# Patient Record
Sex: MALE | Race: WHITE | NOT HISPANIC OR LATINO
[De-identification: names, ages, dates, MRNs, and addresses within clinical notes are randomized per-mention and may not be internally consistent; named-entity substitution may affect disease eponyms.]

---

## 2018-03-12 ENCOUNTER — RX RENEWAL (OUTPATIENT)
Age: 73
End: 2018-03-12

## 2018-06-14 ENCOUNTER — RX RENEWAL (OUTPATIENT)
Age: 73
End: 2018-06-14

## 2018-08-29 ENCOUNTER — APPOINTMENT (OUTPATIENT)
Dept: INTERNAL MEDICINE | Facility: CLINIC | Age: 73
End: 2018-08-29
Payer: MEDICARE

## 2018-08-29 VITALS
BODY MASS INDEX: 23.42 KG/M2 | WEIGHT: 151 LBS | HEIGHT: 67.5 IN | TEMPERATURE: 98.1 F | SYSTOLIC BLOOD PRESSURE: 130 MMHG | DIASTOLIC BLOOD PRESSURE: 90 MMHG

## 2018-08-29 VITALS — DIASTOLIC BLOOD PRESSURE: 82 MMHG | SYSTOLIC BLOOD PRESSURE: 120 MMHG

## 2018-08-29 PROCEDURE — 93000 ELECTROCARDIOGRAM COMPLETE: CPT

## 2018-08-29 PROCEDURE — 99214 OFFICE O/P EST MOD 30 MIN: CPT | Mod: 25

## 2018-08-29 PROCEDURE — G0438: CPT

## 2018-08-29 PROCEDURE — 36415 COLL VENOUS BLD VENIPUNCTURE: CPT

## 2018-08-29 NOTE — PHYSICAL EXAM
[No Acute Distress] : no acute distress [Well Nourished] : well nourished [Well Developed] : well developed [Well-Appearing] : well-appearing [Normal Sclera/Conjunctiva] : normal sclera/conjunctiva [PERRL] : pupils equal round and reactive to light [EOMI] : extraocular movements intact [Normal Outer Ear/Nose] : the outer ears and nose were normal in appearance [Normal Oropharynx] : the oropharynx was normal [No JVD] : no jugular venous distention [Supple] : supple [No Lymphadenopathy] : no lymphadenopathy [Thyroid Normal, No Nodules] : the thyroid was normal and there were no nodules present [No Respiratory Distress] : no respiratory distress  [Clear to Auscultation] : lungs were clear to auscultation bilaterally [No Accessory Muscle Use] : no accessory muscle use [Normal Rate] : normal rate  [Regular Rhythm] : with a regular rhythm [Normal S1, S2] : normal S1 and S2 [No Murmur] : no murmur heard [No Carotid Bruits] : no carotid bruits [No Abdominal Bruit] : a ~M bruit was not heard ~T in the abdomen [No Varicosities] : no varicosities [Pedal Pulses Present] : the pedal pulses are present [No Edema] : there was no peripheral edema [No Extremity Clubbing/Cyanosis] : no extremity clubbing/cyanosis [No Palpable Aorta] : no palpable aorta [Soft] : abdomen soft [Non Tender] : non-tender [Non-distended] : non-distended [No Masses] : no abdominal mass palpated [No HSM] : no HSM [Normal Bowel Sounds] : normal bowel sounds [Normal Sphincter Tone] : normal sphincter tone [No Mass] : no mass [Prostate Tenderness] : the prostate was not tender [Prostate Enlarged] : was enlarged [Prostate Size___ (Scale 0-4)] : prostate size was [unfilled] on a scale of 0-4 [Normal Posterior Cervical Nodes] : no posterior cervical lymphadenopathy [Normal Anterior Cervical Nodes] : no anterior cervical lymphadenopathy [No CVA Tenderness] : no CVA  tenderness [No Spinal Tenderness] : no spinal tenderness [No Joint Swelling] : no joint swelling [Grossly Normal Strength/Tone] : grossly normal strength/tone [No Rash] : no rash [Normal Gait] : normal gait [Coordination Grossly Intact] : coordination grossly intact [No Focal Deficits] : no focal deficits [Deep Tendon Reflexes (DTR)] : deep tendon reflexes were 2+ and symmetric [Normal Affect] : the affect was normal [Normal Insight/Judgement] : insight and judgment were intact [Stool Occult Blood] : stool negative for occult blood [Prostate Nodule] : did not have a nodule

## 2018-08-29 NOTE — HEALTH RISK ASSESSMENT
[Very Good] : ~his/her~  mood as very good [Intercurrent ED visits] : went to ED [No falls in past year] : Patient reported no falls in the past year [0] : 2) Feeling down, depressed, or hopeless: Not at all (0) [Employed] : employed [] :  [Fully functional (bathing, dressing, toileting, transferring, walking, feeding)] : Fully functional (bathing, dressing, toileting, transferring, walking, feeding) [Fully functional (using the telephone, shopping, preparing meals, housekeeping, doing laundry, using] : Fully functional and needs no help or supervision to perform IADLs (using the telephone, shopping, preparing meals, housekeeping, doing laundry, using transportation, managing medications and managing finances) [Discussed at today's visit] : Advance Directives Discussed at today's visit [] : No [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 beer [[id number]] : 0 [Language] : denies difficulty with language [Behavior] : denies difficulty with behavior [Learning/Retaining New Information] : denies difficulty learning/retaining new information [Handling Complex Tasks] : denies difficulty handling complex tasks [Reasoning] : denies difficulty with reasoning [Spatial Ability and Orientation] : denies difficulty with spatial ability and orientation [Reports changes in hearing] : Reports no changes in hearing [Reports changes in vision] : Reports no changes in vision [Reports changes in dental health] : Reports no changes in dental health [ColonoscopyDate] : Car. 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

## 2018-08-29 NOTE — HISTORY OF PRESENT ILLNESS
[FreeTextEntry1] : here for CPE [de-identified] : \par 5 yrs. s/p esophagogastrectomy for esophageal ca;\par CT c/a/p in June ( at [facility])---no active disease\par \par \par generally well\par \par exercise---weights, not much cardio. (has treadmill in basement; doesn't use)\par \par diet---good re: fats /cholesterol\par \par ROS---URI/minimally productive cough; taking Mucinex DM; no fever, chest pain, SOB

## 2018-08-29 NOTE — ASSESSMENT
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 states had neg. bx. in past\par         hyperlipidemia---ran out of statin\par \par plan---FBW:   CBC, lipids, LDL, CMP, FT4, TSH\par            resume atorvastatin 20 mg daily\par            requesting rx for Viagra 50mg prn\par            stress echo (CAD)\par            ? urology consult----PSA pending\par            annual CPE\par         \par

## 2018-08-29 NOTE — REVIEW OF SYSTEMS
[Patient Intake Form Reviewed] : Patient intake form was reviewed [Cough] : cough [Negative] : Respiratory

## 2018-08-30 ENCOUNTER — NON-APPOINTMENT (OUTPATIENT)
Age: 73
End: 2018-08-30

## 2018-08-30 LAB
ALBUMIN SERPL ELPH-MCNC: 4 G/DL
ALP BLD-CCNC: 101 U/L
ALT SERPL-CCNC: 10 U/L
ANION GAP SERPL CALC-SCNC: 13 MMOL/L
APPEARANCE: CLEAR
AST SERPL-CCNC: 20 U/L
BACTERIA: NEGATIVE
BASOPHILS # BLD AUTO: 0.05 K/UL
BASOPHILS NFR BLD AUTO: 0.7 %
BILIRUB SERPL-MCNC: 0.4 MG/DL
BILIRUBIN URINE: NEGATIVE
BLOOD URINE: NEGATIVE
BUN SERPL-MCNC: 17 MG/DL
CALCIUM SERPL-MCNC: 9.2 MG/DL
CHLORIDE SERPL-SCNC: 105 MMOL/L
CHOLEST SERPL-MCNC: 182 MG/DL
CHOLEST/HDLC SERPL: 3.4 RATIO
CO2 SERPL-SCNC: 25 MMOL/L
COLOR: YELLOW
CREAT SERPL-MCNC: 1 MG/DL
EOSINOPHIL # BLD AUTO: 0.24 K/UL
EOSINOPHIL NFR BLD AUTO: 3.4 %
GLUCOSE QUALITATIVE U: NEGATIVE MG/DL
GLUCOSE SERPL-MCNC: 85 MG/DL
HCT VFR BLD CALC: 43.7 %
HDLC SERPL-MCNC: 53 MG/DL
HGB BLD-MCNC: 13.9 G/DL
HYALINE CASTS: 0 /LPF
IMM GRANULOCYTES NFR BLD AUTO: 0.6 %
KETONES URINE: NEGATIVE
LDLC SERPL CALC-MCNC: 115 MG/DL
LDLC SERPL DIRECT ASSAY-MCNC: 122 MG/DL
LEUKOCYTE ESTERASE URINE: ABNORMAL
LYMPHOCYTES # BLD AUTO: 1.15 K/UL
LYMPHOCYTES NFR BLD AUTO: 16.1 %
MAN DIFF?: NORMAL
MCHC RBC-ENTMCNC: 28.7 PG
MCHC RBC-ENTMCNC: 31.8 GM/DL
MCV RBC AUTO: 90.1 FL
MICROSCOPIC-UA: NORMAL
MONOCYTES # BLD AUTO: 0.64 K/UL
MONOCYTES NFR BLD AUTO: 8.9 %
NEUTROPHILS # BLD AUTO: 5.04 K/UL
NEUTROPHILS NFR BLD AUTO: 70.3 %
NITRITE URINE: POSITIVE
PH URINE: 5.5
PLATELET # BLD AUTO: 255 K/UL
POTASSIUM SERPL-SCNC: 4.3 MMOL/L
PROT SERPL-MCNC: 7.3 G/DL
PROTEIN URINE: NEGATIVE MG/DL
PSA SERPL-MCNC: 12.43 NG/ML
RBC # BLD: 4.85 M/UL
RBC # FLD: 14.8 %
RED BLOOD CELLS URINE: 12 /HPF
SAVE SPECIMEN: NORMAL
SODIUM SERPL-SCNC: 143 MMOL/L
SPECIFIC GRAVITY URINE: 1.02
SQUAMOUS EPITHELIAL CELLS: 0 /HPF
T4 FREE SERPL-MCNC: 1.2 NG/DL
TRIGL SERPL-MCNC: 71 MG/DL
TSH SERPL-ACNC: 1.52 UIU/ML
UROBILINOGEN URINE: NEGATIVE MG/DL
WBC # FLD AUTO: 7.16 K/UL
WHITE BLOOD CELLS URINE: 52 /HPF

## 2019-08-11 ENCOUNTER — RX RENEWAL (OUTPATIENT)
Age: 74
End: 2019-08-11

## 2020-07-27 ENCOUNTER — RX RENEWAL (OUTPATIENT)
Age: 75
End: 2020-07-27

## 2020-10-19 ENCOUNTER — RX RENEWAL (OUTPATIENT)
Age: 75
End: 2020-10-19

## 2020-12-30 ENCOUNTER — APPOINTMENT (OUTPATIENT)
Dept: INTERNAL MEDICINE | Facility: CLINIC | Age: 75
End: 2020-12-30
Payer: MEDICARE

## 2020-12-30 VITALS — SYSTOLIC BLOOD PRESSURE: 140 MMHG | DIASTOLIC BLOOD PRESSURE: 70 MMHG

## 2020-12-30 VITALS
RESPIRATION RATE: 17 BRPM | WEIGHT: 152 LBS | OXYGEN SATURATION: 98 % | SYSTOLIC BLOOD PRESSURE: 136 MMHG | HEART RATE: 82 BPM | BODY MASS INDEX: 23.86 KG/M2 | HEIGHT: 67 IN | DIASTOLIC BLOOD PRESSURE: 90 MMHG

## 2020-12-30 VITALS — TEMPERATURE: 97.3 F

## 2020-12-30 DIAGNOSIS — E55.9 VITAMIN D DEFICIENCY, UNSPECIFIED: ICD-10-CM

## 2020-12-30 DIAGNOSIS — R94.31 ABNORMAL ELECTROCARDIOGRAM [ECG] [EKG]: ICD-10-CM

## 2020-12-30 DIAGNOSIS — R30.0 DYSURIA: ICD-10-CM

## 2020-12-30 DIAGNOSIS — J30.9 ALLERGIC RHINITIS, UNSPECIFIED: ICD-10-CM

## 2020-12-30 DIAGNOSIS — Z12.5 ENCOUNTER FOR SCREENING FOR MALIGNANT NEOPLASM OF PROSTATE: ICD-10-CM

## 2020-12-30 PROCEDURE — 99214 OFFICE O/P EST MOD 30 MIN: CPT | Mod: 25

## 2020-12-30 PROCEDURE — G0439: CPT

## 2020-12-30 PROCEDURE — 36415 COLL VENOUS BLD VENIPUNCTURE: CPT

## 2020-12-30 PROCEDURE — 93000 ELECTROCARDIOGRAM COMPLETE: CPT

## 2020-12-30 NOTE — HEALTH RISK ASSESSMENT
[Excellent] : ~his/her~  mood as  excellent [Intercurrent ED visits] : went to ED [Yes] : Yes [No falls in past year] : Patient reported no falls in the past year [0] : 2) Feeling down, depressed, or hopeless: Not at all (0) [Name: ___] : Health Care Proxy's Name: [unfilled]  [Relationship: ___] : Relationship: [unfilled] [I will adhere to the patient's wishes as expressed in the advance directive except as noted below.] : I will adhere to the patient's wishes as expressed in the advance directive except as noted below [None] : None [With Family] : lives with family [# of Members in Household ___] :  household currently consist of [unfilled] member(s) [Employed] : employed [College] : College [] :  [# Of Children ___] : has [unfilled] children [Sexually Active] : sexually active [Feels Safe at Home] : Feels safe at home [Fully functional (bathing, dressing, toileting, transferring, walking, feeding)] : Fully functional (bathing, dressing, toileting, transferring, walking, feeding) [Fully functional (using the telephone, shopping, preparing meals, housekeeping, doing laundry, using] : Fully functional and needs no help or supervision to perform IADLs (using the telephone, shopping, preparing meals, housekeeping, doing laundry, using transportation, managing medications and managing finances) [Reports normal functional visual acuity (ie: able to read med bottle)] : Reports normal functional visual acuity [Smoke Detector] : smoke detector [Carbon Monoxide Detector] : carbon monoxide detector [Seat Belt] :  uses seat belt [Sunscreen] : uses sunscreen [] : No [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 week [de-identified] : 10-15 min. daily [de-identified] : ad marisol [[id number]] : 0 [Change in mental status noted] : No change in mental status noted [High Risk Behavior] : no high risk behavior [Reports changes in hearing] : Reports no changes in hearing [Reports changes in vision] : Reports no changes in vision [Reports changes in dental health] : Reports no changes in dental health [Guns at Home] : no guns at home [Travel to Developing Areas] : does not  travel to developing areas [TB Exposure] : is not being exposed to tuberculosis [ColonoscopyDate] : 01/13 [ColonoscopyComments] : f/u 3 yr.s; mother d. 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

## 2020-12-30 NOTE — ASSESSMENT
[FreeTextEntry1] : \par ecg----SR; NSSTT abn.; NSCSPT\par cxr---not done ; gets surveillance CTs chest /abdomen/pelvis for esophageal ca\par \par \par imp---hyperlipidemia---panel to be checked on statin\par          allergic rhinitis===prn use of fluticasone nasal spray\par          adenocarcinoma of esophagus---s/p chemo, chemo-radiation and surgery in 2013; currently\par          on surveillance per MDs at [facility]\par          dysuria--w/o chills or fever; intermittent\par          elevated BP w/o dx. of hypertension\par \par plan---FBW per CPE\par            home BPs advised; to record and send me report; ? meds\par            continue [facility] surveillance for esophageal ca\par            urology f/u\par            annual CPE\par

## 2020-12-30 NOTE — HISTORY OF PRESENT ILLNESS
[FreeTextEntry1] :  \par here for CPE [de-identified] : \par SH----working; no intention of retiring\par \par PH---esophageal ca---w/o active disease per MD JENNIFER,  June 2020\par \par ROS---intermittent burning passing urine (every couple of weeks)

## 2020-12-30 NOTE — PHYSICAL EXAM
[No Acute Distress] : no acute distress [Well Nourished] : well nourished [Well Developed] : well developed [Well-Appearing] : well-appearing [Normal Sclera/Conjunctiva] : normal sclera/conjunctiva [PERRL] : pupils equal round and reactive to light [EOMI] : extraocular movements intact [Normal Outer Ear/Nose] : the outer ears and nose were normal in appearance [Normal Oropharynx] : the oropharynx was normal [No JVD] : no jugular venous distention [No Lymphadenopathy] : no lymphadenopathy [Supple] : supple [Thyroid Normal, No Nodules] : the thyroid was normal and there were no nodules present [No Respiratory Distress] : no respiratory distress  [No Accessory Muscle Use] : no accessory muscle use [Clear to Auscultation] : lungs were clear to auscultation bilaterally [Normal Rate] : normal rate  [Regular Rhythm] : with a regular rhythm [Normal S1, S2] : normal S1 and S2 [No Murmur] : no murmur heard [No Carotid Bruits] : no carotid bruits [No Abdominal Bruit] : a ~M bruit was not heard ~T in the abdomen [No Varicosities] : no varicosities [Pedal Pulses Present] : the pedal pulses are present [No Edema] : there was no peripheral edema [No Palpable Aorta] : no palpable aorta [No Extremity Clubbing/Cyanosis] : no extremity clubbing/cyanosis [Soft] : abdomen soft [Non Tender] : non-tender [Non-distended] : non-distended [No Masses] : no abdominal mass palpated [No HSM] : no HSM [Normal Bowel Sounds] : normal bowel sounds [Normal Posterior Cervical Nodes] : no posterior cervical lymphadenopathy [Normal Anterior Cervical Nodes] : no anterior cervical lymphadenopathy [No CVA Tenderness] : no CVA  tenderness [No Spinal Tenderness] : no spinal tenderness [No Joint Swelling] : no joint swelling [Grossly Normal Strength/Tone] : grossly normal strength/tone [No Rash] : no rash [Coordination Grossly Intact] : coordination grossly intact [No Focal Deficits] : no focal deficits [Normal Gait] : normal gait [Normal Affect] : the affect was normal [Normal Insight/Judgement] : insight and judgment were intact [de-identified] : will be seeing urologist

## 2020-12-31 ENCOUNTER — NON-APPOINTMENT (OUTPATIENT)
Age: 75
End: 2020-12-31

## 2021-01-01 LAB
24R-OH-CALCIDIOL SERPL-MCNC: 49.6 PG/ML
ALBUMIN SERPL ELPH-MCNC: 4.2 G/DL
ALP BLD-CCNC: 101 U/L
ALT SERPL-CCNC: 14 U/L
ANION GAP SERPL CALC-SCNC: 13 MMOL/L
APPEARANCE: ABNORMAL
AST SERPL-CCNC: 20 U/L
BACTERIA: ABNORMAL
BASOPHILS # BLD AUTO: 0.07 K/UL
BASOPHILS NFR BLD AUTO: 1.1 %
BILIRUB SERPL-MCNC: 0.5 MG/DL
BILIRUBIN URINE: NEGATIVE
BLOOD URINE: NEGATIVE
BUN SERPL-MCNC: 19 MG/DL
CALCIUM SERPL-MCNC: 9.6 MG/DL
CHLORIDE SERPL-SCNC: 103 MMOL/L
CHOLEST SERPL-MCNC: 164 MG/DL
CO2 SERPL-SCNC: 23 MMOL/L
COLOR: YELLOW
CREAT SERPL-MCNC: 1.03 MG/DL
EOSINOPHIL # BLD AUTO: 0.21 K/UL
EOSINOPHIL NFR BLD AUTO: 3.3 %
GLUCOSE QUALITATIVE U: NEGATIVE
GLUCOSE SERPL-MCNC: 94 MG/DL
HCT VFR BLD CALC: 45.3 %
HDLC SERPL-MCNC: 69 MG/DL
HGB BLD-MCNC: 14.5 G/DL
HYALINE CASTS: 1 /LPF
IMM GRANULOCYTES NFR BLD AUTO: 0.3 %
KETONES URINE: NEGATIVE
LDLC SERPL CALC-MCNC: 85 MG/DL
LDLC SERPL DIRECT ASSAY-MCNC: 88 MG/DL
LEUKOCYTE ESTERASE URINE: ABNORMAL
LYMPHOCYTES # BLD AUTO: 1.5 K/UL
LYMPHOCYTES NFR BLD AUTO: 23.4 %
MAN DIFF?: NORMAL
MCHC RBC-ENTMCNC: 29.7 PG
MCHC RBC-ENTMCNC: 32 GM/DL
MCV RBC AUTO: 92.6 FL
MICROSCOPIC-UA: NORMAL
MONOCYTES # BLD AUTO: 0.52 K/UL
MONOCYTES NFR BLD AUTO: 8.1 %
NEUTROPHILS # BLD AUTO: 4.08 K/UL
NEUTROPHILS NFR BLD AUTO: 63.8 %
NITRITE URINE: POSITIVE
NONHDLC SERPL-MCNC: 95 MG/DL
PH URINE: 7.5
PLATELET # BLD AUTO: 213 K/UL
POTASSIUM SERPL-SCNC: 4 MMOL/L
PROT SERPL-MCNC: 7.6 G/DL
PROTEIN URINE: ABNORMAL
PSA FREE FLD-MCNC: 20 %
PSA FREE SERPL-MCNC: 2.33 NG/ML
PSA SERPL-MCNC: 11.7 NG/ML
RBC # BLD: 4.89 M/UL
RBC # FLD: 14.6 %
RED BLOOD CELLS URINE: 7 /HPF
SODIUM SERPL-SCNC: 140 MMOL/L
SPECIFIC GRAVITY URINE: 1.03
SQUAMOUS EPITHELIAL CELLS: 1 /HPF
T4 FREE SERPL-MCNC: 1 NG/DL
TRIGL SERPL-MCNC: 52 MG/DL
TRIPLE PHOSPHATE CRYSTALS: ABNORMAL
TSH SERPL-ACNC: 2.48 UIU/ML
URINE COMMENTS: NORMAL
UROBILINOGEN URINE: NORMAL
WBC # FLD AUTO: 6.4 K/UL
WHITE BLOOD CELLS URINE: 281 /HPF

## 2021-01-12 ENCOUNTER — RX RENEWAL (OUTPATIENT)
Age: 76
End: 2021-01-12

## 2021-02-02 ENCOUNTER — TRANSCRIPTION ENCOUNTER (OUTPATIENT)
Age: 76
End: 2021-02-02

## 2022-01-25 ENCOUNTER — RX RENEWAL (OUTPATIENT)
Age: 77
End: 2022-01-25

## 2022-08-17 DIAGNOSIS — Z00.00 ENCOUNTER FOR GENERAL ADULT MEDICAL EXAMINATION W/OUT ABNORMAL FINDINGS: ICD-10-CM

## 2022-08-18 ENCOUNTER — APPOINTMENT (OUTPATIENT)
Dept: INTERNAL MEDICINE | Facility: CLINIC | Age: 77
End: 2022-08-18

## 2022-08-18 VITALS
TEMPERATURE: 98 F | DIASTOLIC BLOOD PRESSURE: 82 MMHG | SYSTOLIC BLOOD PRESSURE: 136 MMHG | WEIGHT: 152 LBS | HEART RATE: 61 BPM | BODY MASS INDEX: 23.86 KG/M2 | OXYGEN SATURATION: 96 % | HEIGHT: 67 IN

## 2022-08-18 VITALS — SYSTOLIC BLOOD PRESSURE: 130 MMHG | DIASTOLIC BLOOD PRESSURE: 90 MMHG

## 2022-08-18 VITALS — DIASTOLIC BLOOD PRESSURE: 80 MMHG | SYSTOLIC BLOOD PRESSURE: 130 MMHG

## 2022-08-18 DIAGNOSIS — N21.0 CALCULUS IN BLADDER: ICD-10-CM

## 2022-08-18 DIAGNOSIS — R97.20 ELEVATED PROSTATE, SPECIFIC ANTIGEN [PSA]: ICD-10-CM

## 2022-08-18 DIAGNOSIS — C15.9 MALIGNANT NEOPLASM OF ESOPHAGUS, UNSPECIFIED: ICD-10-CM

## 2022-08-18 DIAGNOSIS — R03.0 ELEVATED BLOOD-PRESSURE READING, W/OUT DIAGNOSIS OF HYPERTENSION: ICD-10-CM

## 2022-08-18 DIAGNOSIS — I10 ESSENTIAL (PRIMARY) HYPERTENSION: ICD-10-CM

## 2022-08-18 LAB
ALBUMIN SERPL ELPH-MCNC: 4.3 G/DL
ALP BLD-CCNC: 84 U/L
ALT SERPL-CCNC: 11 U/L
ANION GAP SERPL CALC-SCNC: 13 MMOL/L
APPEARANCE: CLEAR
AST SERPL-CCNC: 18 U/L
BACTERIA: NEGATIVE
BASOPHILS # BLD AUTO: 0.08 K/UL
BASOPHILS NFR BLD AUTO: 1.4 %
BILIRUB SERPL-MCNC: 0.5 MG/DL
BILIRUBIN URINE: NEGATIVE
BLOOD URINE: ABNORMAL
BUN SERPL-MCNC: 18 MG/DL
CALCIUM SERPL-MCNC: 9.6 MG/DL
CHLORIDE SERPL-SCNC: 105 MMOL/L
CHOLEST SERPL-MCNC: 223 MG/DL
CO2 SERPL-SCNC: 24 MMOL/L
COLOR: YELLOW
CREAT SERPL-MCNC: 1.09 MG/DL
EGFR: 69 ML/MIN/1.73M2
EOSINOPHIL # BLD AUTO: 0.19 K/UL
EOSINOPHIL NFR BLD AUTO: 3.3 %
ERYTHROCYTE [SEDIMENTATION RATE] IN BLOOD BY WESTERGREN METHOD: 25 MM/HR
ESTIMATED AVERAGE GLUCOSE: 131 MG/DL
GLUCOSE QUALITATIVE U: NEGATIVE
GLUCOSE SERPL-MCNC: 100 MG/DL
HBA1C MFR BLD HPLC: 6.2 %
HCT VFR BLD CALC: 44.6 %
HDLC SERPL-MCNC: 69 MG/DL
HGB BLD-MCNC: 14.2 G/DL
HYALINE CASTS: 1 /LPF
IMM GRANULOCYTES NFR BLD AUTO: 0.2 %
KETONES URINE: NEGATIVE
LDLC SERPL CALC-MCNC: 141 MG/DL
LDLC SERPL DIRECT ASSAY-MCNC: 145 MG/DL
LEUKOCYTE ESTERASE URINE: NEGATIVE
LYMPHOCYTES # BLD AUTO: 1.11 K/UL
LYMPHOCYTES NFR BLD AUTO: 19.5 %
MAN DIFF?: NORMAL
MCHC RBC-ENTMCNC: 28.9 PG
MCHC RBC-ENTMCNC: 31.8 GM/DL
MCV RBC AUTO: 90.7 FL
MICROSCOPIC-UA: NORMAL
MONOCYTES # BLD AUTO: 0.45 K/UL
MONOCYTES NFR BLD AUTO: 7.9 %
NEUTROPHILS # BLD AUTO: 3.85 K/UL
NEUTROPHILS NFR BLD AUTO: 67.7 %
NITRITE URINE: NEGATIVE
NONHDLC SERPL-MCNC: 154 MG/DL
PH URINE: 7
PLATELET # BLD AUTO: 212 K/UL
POTASSIUM SERPL-SCNC: 4.5 MMOL/L
PROT SERPL-MCNC: 6.7 G/DL
PROTEIN URINE: NORMAL
PSA SERPL-MCNC: 13.2 NG/ML
RBC # BLD: 4.92 M/UL
RBC # FLD: 14.5 %
RED BLOOD CELLS URINE: 201 /HPF
SODIUM SERPL-SCNC: 142 MMOL/L
SPECIFIC GRAVITY URINE: 1.02
SQUAMOUS EPITHELIAL CELLS: 4 /HPF
T4 FREE SERPL-MCNC: 1 NG/DL
TRIGL SERPL-MCNC: 61 MG/DL
TSH SERPL-ACNC: 2.32 UIU/ML
UROBILINOGEN URINE: NORMAL
WBC # FLD AUTO: 5.69 K/UL
WHITE BLOOD CELLS URINE: 1 /HPF

## 2022-08-18 PROCEDURE — G0439: CPT

## 2022-08-18 PROCEDURE — 93000 ELECTROCARDIOGRAM COMPLETE: CPT

## 2022-08-18 PROCEDURE — 99214 OFFICE O/P EST MOD 30 MIN: CPT | Mod: 25

## 2022-08-18 PROCEDURE — G0438: CPT

## 2022-08-19 ENCOUNTER — NON-APPOINTMENT (OUTPATIENT)
Age: 77
End: 2022-08-19

## 2022-12-16 ENCOUNTER — NON-APPOINTMENT (OUTPATIENT)
Age: 77
End: 2022-12-16

## 2024-04-18 ENCOUNTER — LABORATORY RESULT (OUTPATIENT)
Age: 79
End: 2024-04-18

## 2024-04-18 ENCOUNTER — APPOINTMENT (OUTPATIENT)
Dept: CARDIOLOGY | Facility: CLINIC | Age: 79
End: 2024-04-18
Payer: MEDICARE

## 2024-04-18 ENCOUNTER — NON-APPOINTMENT (OUTPATIENT)
Age: 79
End: 2024-04-18

## 2024-04-18 VITALS
HEIGHT: 67 IN | SYSTOLIC BLOOD PRESSURE: 112 MMHG | DIASTOLIC BLOOD PRESSURE: 82 MMHG | BODY MASS INDEX: 23.7 KG/M2 | OXYGEN SATURATION: 97 % | HEART RATE: 72 BPM | WEIGHT: 151 LBS

## 2024-04-18 DIAGNOSIS — R06.09 OTHER FORMS OF DYSPNEA: ICD-10-CM

## 2024-04-18 DIAGNOSIS — I25.10 ATHEROSCLEROTIC HEART DISEASE OF NATIVE CORONARY ARTERY W/OUT ANGINA PECTORIS: ICD-10-CM

## 2024-04-18 DIAGNOSIS — R73.03 PREDIABETES.: ICD-10-CM

## 2024-04-18 DIAGNOSIS — E78.5 HYPERLIPIDEMIA, UNSPECIFIED: ICD-10-CM

## 2024-04-18 DIAGNOSIS — I49.1 ATRIAL PREMATURE DEPOLARIZATION: ICD-10-CM

## 2024-04-18 PROCEDURE — 99204 OFFICE O/P NEW MOD 45 MIN: CPT

## 2024-04-18 PROCEDURE — 93040 RHYTHM ECG WITH REPORT: CPT | Mod: 59

## 2024-04-18 PROCEDURE — 36415 COLL VENOUS BLD VENIPUNCTURE: CPT

## 2024-04-18 PROCEDURE — G2211 COMPLEX E/M VISIT ADD ON: CPT

## 2024-04-18 PROCEDURE — 93000 ELECTROCARDIOGRAM COMPLETE: CPT

## 2024-04-18 RX ORDER — ATORVASTATIN CALCIUM 20 MG/1
20 TABLET, FILM COATED ORAL DAILY
Qty: 90 | Refills: 3 | Status: DISCONTINUED | COMMUNITY
Start: 2022-08-18 | End: 2024-04-18

## 2024-04-18 RX ORDER — AZELASTINE HYDROCHLORIDE 137 UG/1
0.1 SPRAY, METERED NASAL TWICE DAILY
Qty: 1 | Refills: 4 | Status: DISCONTINUED | COMMUNITY
Start: 2020-12-30 | End: 2024-04-18

## 2024-04-18 RX ORDER — ATORVASTATIN CALCIUM 20 MG/1
20 TABLET, FILM COATED ORAL DAILY
Qty: 90 | Refills: 3 | Status: ACTIVE | COMMUNITY
Start: 2024-04-18 | End: 1900-01-01

## 2024-04-18 RX ORDER — ASPIRIN ENTERIC COATED TABLETS 81 MG 81 MG/1
81 TABLET, DELAYED RELEASE ORAL DAILY
Qty: 90 | Refills: 3 | Status: ACTIVE | COMMUNITY
Start: 2024-04-18

## 2024-04-20 ENCOUNTER — NON-APPOINTMENT (OUTPATIENT)
Age: 79
End: 2024-04-20

## 2024-04-28 NOTE — HISTORY OF PRESENT ILLNESS
[FreeTextEntry1] : 04/18/2024 - Office visit: PCP: none Past medical history notable for CAD (nonocclusive disease CCTA in 2010), hyperlipidemia, reactive hypertension, pre-diabetes, esophageal CA, bladder stones, elevated PSA (he sees a urologist, last 2 years ago). He has had shortness of breath for 1-2 months climbing 3-4 flights of steps stairs.  He does not experience shortness of breath when rowing or doing resistance exercises. He denies chest pain. He currently is on no medications; on his own, he stopped taking atorvastatin twice. He never smoked cigarettes.   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 Prior cardiac workup includes: TTE (5/8/2013): LVEF 60%.  Mild AR/MR/TR. Family history history is notable for his brother with an MI at 44 years old and sisters with MIs at 55 and 60 years old.

## 2024-04-28 NOTE — DISCUSSION/SUMMARY
[FreeTextEntry1] : PLAN (ADDITIONAL): Check blood pressures at home, call me 7-10 days. Discuss with patient that he needs a PCP  VITAL SIGNS: BP: 125/88, 118/92, 122/94, 126/88  PHYSICAL EXAMINATION: Constitutional: well developed/well nourished, no acute distress Eyes: no xanthelasma ENT: no oral cyanosis Neck: without jugular venous distention, without carotid bruits Cardiovascular: regular rhythm, normal S1 and S2, grade 1/6 systolic murmur Respiratory: no respiratory distress, lungs clear to auscultation bilaterally Abdomen: soft, non-tender, no abdominal mass palpated, no hepatosplenomegaly, bowel sounds normal Extremities: without clubbing, cyanosis, edema, or tenderness Musculoskeletal: gait sufficient for exercise testing Neurologic: alert and oriented X 3 Psych: affect normal

## 2024-05-12 LAB
ALBUMIN SERPL ELPH-MCNC: 4.4 G/DL
ALP BLD-CCNC: 118 U/L
ALT SERPL-CCNC: 11 U/L
ANION GAP SERPL CALC-SCNC: 12 MMOL/L
AST SERPL-CCNC: 19 U/L
BILIRUB SERPL-MCNC: 0.7 MG/DL
BUN SERPL-MCNC: 17 MG/DL
CALCIUM SERPL-MCNC: 9.5 MG/DL
CHLORIDE SERPL-SCNC: 102 MMOL/L
CHOLEST SERPL-MCNC: 249 MG/DL
CO2 SERPL-SCNC: 25 MMOL/L
CREAT SERPL-MCNC: 1.1 MG/DL
EGFR: 68 ML/MIN/1.73M2
ESTIMATED AVERAGE GLUCOSE: 131 MG/DL
GLUCOSE SERPL-MCNC: 95 MG/DL
HBA1C MFR BLD HPLC: 6.2 %
HDLC SERPL-MCNC: 70 MG/DL
LDLC SERPL CALC-MCNC: 167 MG/DL
LDLC SERPL DIRECT ASSAY-MCNC: 168 MG/DL
NONHDLC SERPL-MCNC: 178 MG/DL
POTASSIUM SERPL-SCNC: 4.3 MMOL/L
PROT SERPL-MCNC: 8 G/DL
SODIUM SERPL-SCNC: 138 MMOL/L
T3 SERPL-MCNC: 85 NG/DL
T3RU NFR SERPL: 1 TBI
T4 FREE SERPL-MCNC: 1.1 NG/DL
T4 SERPL-MCNC: 6.7 UG/DL
TRIGL SERPL-MCNC: 68 MG/DL
TSH SERPL-ACNC: 2.81 UIU/ML

## 2024-05-15 ENCOUNTER — APPOINTMENT (OUTPATIENT)
Dept: CARDIOLOGY | Facility: CLINIC | Age: 79
End: 2024-05-15
Payer: MEDICARE

## 2024-05-15 PROCEDURE — A9500: CPT

## 2024-05-15 PROCEDURE — 78452 HT MUSCLE IMAGE SPECT MULT: CPT

## 2024-05-15 PROCEDURE — 93015 CV STRESS TEST SUPVJ I&R: CPT

## 2024-05-15 PROCEDURE — 93306 TTE W/DOPPLER COMPLETE: CPT

## 2024-06-19 ENCOUNTER — NON-APPOINTMENT (OUTPATIENT)
Age: 79
End: 2024-06-19

## 2024-07-31 ENCOUNTER — OFFICE (OUTPATIENT)
Dept: URBAN - METROPOLITAN AREA CLINIC 12 | Facility: CLINIC | Age: 79
Setting detail: OPHTHALMOLOGY
End: 2024-07-31
Payer: MEDICARE

## 2024-07-31 DIAGNOSIS — H02.403: ICD-10-CM

## 2024-07-31 DIAGNOSIS — H25.13: ICD-10-CM

## 2024-07-31 DIAGNOSIS — H43.823: ICD-10-CM

## 2024-07-31 DIAGNOSIS — H43.393: ICD-10-CM

## 2024-07-31 PROBLEM — H52.7 REFRACTIVE ERROR: Status: ACTIVE | Noted: 2024-07-31

## 2024-07-31 PROCEDURE — 92134 CPTRZ OPH DX IMG PST SGM RTA: CPT | Performed by: OPHTHALMOLOGY

## 2024-07-31 PROCEDURE — 92004 COMPRE OPH EXAM NEW PT 1/>: CPT | Performed by: OPHTHALMOLOGY

## 2024-07-31 ASSESSMENT — CONFRONTATIONAL VISUAL FIELD TEST (CVF)
OD_FINDINGS: FULL
OS_FINDINGS: FULL

## 2024-07-31 ASSESSMENT — LID POSITION - PTOSIS
OS_PTOSIS: LUL 2+
OD_PTOSIS: RUL 2+

## 2024-08-12 ENCOUNTER — NON-APPOINTMENT (OUTPATIENT)
Age: 79
End: 2024-08-12

## 2024-08-12 ENCOUNTER — APPOINTMENT (OUTPATIENT)
Dept: CARDIOLOGY | Facility: CLINIC | Age: 79
End: 2024-08-12
Payer: MEDICARE

## 2024-08-12 VITALS
SYSTOLIC BLOOD PRESSURE: 152 MMHG | HEART RATE: 58 BPM | OXYGEN SATURATION: 97 % | BODY MASS INDEX: 24.12 KG/M2 | DIASTOLIC BLOOD PRESSURE: 80 MMHG | WEIGHT: 154 LBS

## 2024-08-12 VITALS — DIASTOLIC BLOOD PRESSURE: 74 MMHG | SYSTOLIC BLOOD PRESSURE: 124 MMHG

## 2024-08-12 DIAGNOSIS — I10 ESSENTIAL (PRIMARY) HYPERTENSION: ICD-10-CM

## 2024-08-12 DIAGNOSIS — I25.10 ATHEROSCLEROTIC HEART DISEASE OF NATIVE CORONARY ARTERY W/OUT ANGINA PECTORIS: ICD-10-CM

## 2024-08-12 DIAGNOSIS — E78.5 HYPERLIPIDEMIA, UNSPECIFIED: ICD-10-CM

## 2024-08-12 PROCEDURE — 99215 OFFICE O/P EST HI 40 MIN: CPT

## 2024-08-12 PROCEDURE — 93000 ELECTROCARDIOGRAM COMPLETE: CPT

## 2024-08-12 NOTE — HISTORY OF PRESENT ILLNESS
[FreeTextEntry1] : 78-year-old gentleman presents for ongoing cardiac care following intermediate of Dr. Licona.  Reports that he feels "great".  Active including daily 15-minute total gym sessions and stair climbing.  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  No effort provoked symptoms.  No c/o chest, throat,jaw, arm or upper back discomfort.  No dyspnea, orthopnea or PND.  No palpitations, dizziness or syncope.  No edema or claudication.  His background is remarkable for: - pulmonary embolus after skiing accident  he underwent esophagectomy and partial gastrectomy for cancer.  Recent PET scan [facility] revealed that he was DEONTE.  He has borderline hypertension.  Monitors his blood pressure at home.  Terminations at home are typically around 118/75.  Mild hyperlipidemia.  No history of thyroid disorder, rheumatic fever or murmur.  -2010 coronary CTA less than 40% "minimal" distal left main disease approximately 50% proximal and ostial LAD stenosis, mild to moderate circumflex stenosis (proximal 50%) and mild right coronary disease.  Saw Dr. Licona in April complaining of feeling sluggish.   nuclear stress test was normal through 10 METS.  TTE revealed normal systolic function with E/E prime ratio elevation consistent with elevated filling pressures.  The ascending aortic diameter was 3.7 cm.  There was mild AI, mild MR and mild PI.  Symptoms remitted spontaneously.  Never smoked.  Father  of an MI at 54.  Brother  of an MI at 44.

## 2024-08-12 NOTE — ASSESSMENT
[FreeTextEntry1] : Hypertension-likely essential; well-controlled Hyperlipidemia-5/12/2024 LDL was 167 with HDL 70 total cholesterol 249 and triglycerides 68; far from LDL target given...  -Coronary artery disease as above.  2000 CTA revealed less than 40% distal left main stenosis ("minimal") around 50% ostial LAD and proximal LAD stenosis, mild to moderate circumflex stenosis (approximately 50%) and mild right coronary artery disease. History of remote PE Bronchiectasis Status post esophagectomy and partial gastrectomy-DEONTE

## 2024-08-12 NOTE — REVIEW OF SYSTEMS
[TextEntry] : GEN: Denies appetite change, unusual weakness, bleeding, fever, chills, recent trauma or infection. Weight has been stable.  HEENT: no vision change or epistaxis NECK: No history of thyroid disease LUNGS: No cough or hemoptysis GASTROINTESTINAL: No abdominal pain, nausea, vomiting, hematemesis, diarrhea, constipation, melena.  Recent small amount of bright red blood per rectum.  Plans to see Dr. Reynaldo Coley. : No dysuria or frequency. No hematuria. Nocturia x 0-1 SKIN: Denies rash, easy bruising or pruritus. BJE:  no back pain. No cramps or myalgias. NEURO: Denies headache or vertigo. No amaurosis. No new focal motor, sensory or speech symptoms. PSYCH: No anxiety or depression.

## 2024-08-12 NOTE — PHYSICAL EXAM
[TextEntry] : GEN: pleasant, well-developed. No distress. No icterus or pallor. HEENT: Normal conjunctiva and mucous membranes; no xanthelasma. NECK: No JVD. 2+ equal carotid upstrokes without bruits. No thyromegaly. CHEST: Lungs clear to percussion and auscultation. CARDIAC: PMI not palpable. S1 and S2 normal,  no murmur, gallop, click or rub ABDOMEN: Soft, nontender, no organomegaly. The abdominal aorta is not palpable. No bruits. EXTREMITIES: No cyanosis clubbing or edema. Pedal pulses are 2+ and equal NEURO: Alert and oriented x3. Recent memory appears normal . No focal weakness. No gait or speech disturbance.

## 2025-01-16 ENCOUNTER — NON-APPOINTMENT (OUTPATIENT)
Age: 80
End: 2025-01-16

## 2025-01-18 ENCOUNTER — NON-APPOINTMENT (OUTPATIENT)
Age: 80
End: 2025-01-18

## 2025-01-18 ENCOUNTER — RESULT REVIEW (OUTPATIENT)
Age: 80
End: 2025-01-18

## 2025-01-18 ENCOUNTER — INPATIENT (INPATIENT)
Facility: HOSPITAL | Age: 80
LOS: 3 days | Discharge: HOME | DRG: 195 | End: 2025-01-22
Attending: STUDENT IN AN ORGANIZED HEALTH CARE EDUCATION/TRAINING PROGRAM | Admitting: STUDENT IN AN ORGANIZED HEALTH CARE EDUCATION/TRAINING PROGRAM
Payer: MEDICARE

## 2025-01-18 VITALS
WEIGHT: 164.91 LBS | OXYGEN SATURATION: 90 % | HEIGHT: 68 IN | SYSTOLIC BLOOD PRESSURE: 116 MMHG | TEMPERATURE: 98 F | HEART RATE: 107 BPM | RESPIRATION RATE: 24 BRPM | DIASTOLIC BLOOD PRESSURE: 62 MMHG

## 2025-01-18 DIAGNOSIS — J18.9 PNEUMONIA, UNSPECIFIED ORGANISM: ICD-10-CM

## 2025-01-18 LAB
ALBUMIN SERPL ELPH-MCNC: 2.2 G/DL — LOW (ref 3.3–5)
ALP SERPL-CCNC: 110 U/L — SIGNIFICANT CHANGE UP (ref 30–120)
ALT FLD-CCNC: 23 U/L — SIGNIFICANT CHANGE UP (ref 10–60)
ANION GAP SERPL CALC-SCNC: 10 MMOL/L — SIGNIFICANT CHANGE UP (ref 5–17)
APPEARANCE UR: ABNORMAL
APTT BLD: 29.4 SEC — SIGNIFICANT CHANGE UP (ref 24.5–35.6)
AST SERPL-CCNC: 36 U/L — SIGNIFICANT CHANGE UP (ref 10–40)
BASE EXCESS BLDA CALC-SCNC: -0.6 MMOL/L — SIGNIFICANT CHANGE UP (ref -2–3)
BASE EXCESS BLDV CALC-SCNC: 2.7 MMOL/L — SIGNIFICANT CHANGE UP (ref -2–3)
BASOPHILS # BLD AUTO: 0 K/UL — SIGNIFICANT CHANGE UP (ref 0–0.2)
BASOPHILS NFR BLD AUTO: 0 % — SIGNIFICANT CHANGE UP (ref 0–2)
BILIRUB SERPL-MCNC: 2 MG/DL — HIGH (ref 0.2–1.2)
BILIRUB UR-MCNC: ABNORMAL
BUN SERPL-MCNC: 37 MG/DL — HIGH (ref 7–23)
CALCIUM SERPL-MCNC: 9.6 MG/DL — SIGNIFICANT CHANGE UP (ref 8.4–10.5)
CHLORIDE SERPL-SCNC: 98 MMOL/L — SIGNIFICANT CHANGE UP (ref 96–108)
CO2 SERPL-SCNC: 27 MMOL/L — SIGNIFICANT CHANGE UP (ref 22–31)
COLOR SPEC: SIGNIFICANT CHANGE UP
CREAT SERPL-MCNC: 1.55 MG/DL — HIGH (ref 0.5–1.3)
DIFF PNL FLD: ABNORMAL
EGFR: 45 ML/MIN/1.73M2 — LOW
EOSINOPHIL # BLD AUTO: 0 K/UL — SIGNIFICANT CHANGE UP (ref 0–0.5)
EOSINOPHIL NFR BLD AUTO: 0 % — SIGNIFICANT CHANGE UP (ref 0–6)
GLUCOSE SERPL-MCNC: 130 MG/DL — HIGH (ref 70–99)
GLUCOSE UR QL: NEGATIVE MG/DL — SIGNIFICANT CHANGE UP
HCO3 BLDA-SCNC: 23 MMOL/L — SIGNIFICANT CHANGE UP (ref 21–28)
HCO3 BLDV-SCNC: 27 MMOL/L — SIGNIFICANT CHANGE UP (ref 22–29)
HCT VFR BLD CALC: 40 % — SIGNIFICANT CHANGE UP (ref 39–50)
HGB BLD-MCNC: 13.5 G/DL — SIGNIFICANT CHANGE UP (ref 13–17)
HOROWITZ INDEX BLDA+IHG-RTO: 40 — SIGNIFICANT CHANGE UP
INR BLD: 1.21 RATIO — HIGH (ref 0.85–1.16)
KETONES UR-MCNC: NEGATIVE MG/DL — SIGNIFICANT CHANGE UP
LACTATE SERPL-SCNC: 2.3 MMOL/L — HIGH (ref 0.7–2)
LACTATE SERPL-SCNC: 3 MMOL/L — HIGH (ref 0.7–2)
LEUKOCYTE ESTERASE UR-ACNC: ABNORMAL
LYMPHOCYTES # BLD AUTO: 0.42 K/UL — LOW (ref 1–3.3)
LYMPHOCYTES # BLD AUTO: 2 % — LOW (ref 13–44)
MCHC RBC-ENTMCNC: 29.1 PG — SIGNIFICANT CHANGE UP (ref 27–34)
MCHC RBC-ENTMCNC: 33.8 G/DL — SIGNIFICANT CHANGE UP (ref 32–36)
MCV RBC AUTO: 86.2 FL — SIGNIFICANT CHANGE UP (ref 80–100)
MONOCYTES # BLD AUTO: 0.84 K/UL — SIGNIFICANT CHANGE UP (ref 0–0.9)
MONOCYTES NFR BLD AUTO: 4 % — SIGNIFICANT CHANGE UP (ref 2–14)
NEUTROPHILS # BLD AUTO: 19.67 K/UL — HIGH (ref 1.8–7.4)
NEUTROPHILS NFR BLD AUTO: 85 % — HIGH (ref 43–77)
NITRITE UR-MCNC: NEGATIVE — SIGNIFICANT CHANGE UP
NRBC # BLD: SIGNIFICANT CHANGE UP /100 WBCS (ref 0–0)
NRBC BLD-RTO: SIGNIFICANT CHANGE UP /100 WBCS (ref 0–0)
NT-PROBNP SERPL-SCNC: 445 PG/ML — SIGNIFICANT CHANGE UP (ref 0–450)
PCO2 BLDA: 29 MMHG — LOW (ref 35–48)
PCO2 BLDV: 43 MMHG — SIGNIFICANT CHANGE UP (ref 42–55)
PH BLDA: 7.5 — HIGH (ref 7.35–7.45)
PH BLDV: 7.41 — SIGNIFICANT CHANGE UP (ref 7.32–7.43)
PH UR: 5 — SIGNIFICANT CHANGE UP (ref 5–8)
PLATELET # BLD AUTO: 267 K/UL — SIGNIFICANT CHANGE UP (ref 150–400)
PO2 BLDA: 84 MMHG — SIGNIFICANT CHANGE UP (ref 83–108)
PO2 BLDV: <35 MMHG — SIGNIFICANT CHANGE UP (ref 25–45)
POTASSIUM SERPL-MCNC: 3.4 MMOL/L — LOW (ref 3.5–5.3)
POTASSIUM SERPL-SCNC: 3.4 MMOL/L — LOW (ref 3.5–5.3)
PROT SERPL-MCNC: 7.3 G/DL — SIGNIFICANT CHANGE UP (ref 6–8.3)
PROT UR-MCNC: 100 MG/DL
PROTHROM AB SERPL-ACNC: 14 SEC — HIGH (ref 9.9–13.4)
RAPID RVP RESULT: SIGNIFICANT CHANGE UP
RBC # BLD: 4.64 M/UL — SIGNIFICANT CHANGE UP (ref 4.2–5.8)
RBC # FLD: 15.2 % — HIGH (ref 10.3–14.5)
SAO2 % BLDA: 97.7 % — SIGNIFICANT CHANGE UP (ref 94–98)
SAO2 % BLDV: 46.3 % — LOW (ref 67–88)
SARS-COV-2 RNA SPEC QL NAA+PROBE: SIGNIFICANT CHANGE UP
SODIUM SERPL-SCNC: 135 MMOL/L — SIGNIFICANT CHANGE UP (ref 135–145)
SP GR SPEC: 1.03 — SIGNIFICANT CHANGE UP (ref 1–1.03)
TROPONIN I, HIGH SENSITIVITY RESULT: 9.6 NG/L — SIGNIFICANT CHANGE UP
UROBILINOGEN FLD QL: 2 MG/DL (ref 0.2–1)
WBC # BLD: 20.93 K/UL — HIGH (ref 3.8–10.5)
WBC # FLD AUTO: 20.93 K/UL — HIGH (ref 3.8–10.5)

## 2025-01-18 PROCEDURE — 99223 1ST HOSP IP/OBS HIGH 75: CPT

## 2025-01-18 PROCEDURE — 93010 ELECTROCARDIOGRAM REPORT: CPT

## 2025-01-18 PROCEDURE — 71045 X-RAY EXAM CHEST 1 VIEW: CPT | Mod: 26

## 2025-01-18 PROCEDURE — 99285 EMERGENCY DEPT VISIT HI MDM: CPT | Mod: FS

## 2025-01-18 PROCEDURE — 93356 MYOCRD STRAIN IMG SPCKL TRCK: CPT

## 2025-01-18 PROCEDURE — 71250 CT THORAX DX C-: CPT | Mod: 26

## 2025-01-18 PROCEDURE — 93306 TTE W/DOPPLER COMPLETE: CPT | Mod: 26

## 2025-01-18 RX ORDER — ALBUTEROL 90 MCG
2 AEROSOL REFILL (GRAM) INHALATION EVERY 6 HOURS
Refills: 0 | Status: DISCONTINUED | OUTPATIENT
Start: 2025-01-18 | End: 2025-01-22

## 2025-01-18 RX ORDER — ACETAMINOPHEN 160 MG/5ML
650 SUSPENSION ORAL EVERY 6 HOURS
Refills: 0 | Status: DISCONTINUED | OUTPATIENT
Start: 2025-01-18 | End: 2025-01-22

## 2025-01-18 RX ORDER — BACTERIOSTATIC SODIUM CHLORIDE 0.9 %
1300 VIAL (ML) INJECTION ONCE
Refills: 0 | Status: COMPLETED | OUTPATIENT
Start: 2025-01-18 | End: 2025-01-18

## 2025-01-18 RX ORDER — DILTIAZEM HYDROCHLORIDE 60 MG/1
10 TABLET ORAL ONCE
Refills: 0 | Status: COMPLETED | OUTPATIENT
Start: 2025-01-18 | End: 2025-01-18

## 2025-01-18 RX ORDER — SODIUM CHLORIDE 9 G/ML
500 INJECTION, SOLUTION INTRAVENOUS ONCE
Refills: 0 | Status: COMPLETED | OUTPATIENT
Start: 2025-01-18 | End: 2025-01-18

## 2025-01-18 RX ORDER — MAGNESIUM, ALUMINUM HYDROXIDE 200-225/5
30 SUSPENSION, ORAL (FINAL DOSE FORM) ORAL EVERY 4 HOURS
Refills: 0 | Status: DISCONTINUED | OUTPATIENT
Start: 2025-01-18 | End: 2025-01-22

## 2025-01-18 RX ORDER — ATORVASTATIN CALCIUM 80 MG/1
1 TABLET, FILM COATED ORAL
Refills: 0 | DISCHARGE

## 2025-01-18 RX ORDER — APIXABAN 5 MG/1
2.5 TABLET, FILM COATED ORAL EVERY 12 HOURS
Refills: 0 | Status: DISCONTINUED | OUTPATIENT
Start: 2025-01-18 | End: 2025-01-20

## 2025-01-18 RX ORDER — SODIUM CHLORIDE 9 G/ML
1000 INJECTION, SOLUTION INTRAVENOUS
Refills: 0 | Status: DISCONTINUED | OUTPATIENT
Start: 2025-01-18 | End: 2025-01-20

## 2025-01-18 RX ORDER — METOPROLOL SUCCINATE 25 MG
5 TABLET, EXTENDED RELEASE 24 HR ORAL ONCE
Refills: 0 | Status: COMPLETED | OUTPATIENT
Start: 2025-01-18 | End: 2025-01-18

## 2025-01-18 RX ORDER — DILTIAZEM HYDROCHLORIDE 60 MG/1
10 TABLET ORAL
Qty: 125 | Refills: 0 | Status: DISCONTINUED | OUTPATIENT
Start: 2025-01-18 | End: 2025-01-19

## 2025-01-18 RX ORDER — SODIUM CHLORIDE 9 G/ML
1000 INJECTION, SOLUTION INTRAVENOUS ONCE
Refills: 0 | Status: COMPLETED | OUTPATIENT
Start: 2025-01-18 | End: 2025-01-18

## 2025-01-18 RX ORDER — BACTERIOSTATIC SODIUM CHLORIDE 0.9 %
1000 VIAL (ML) INJECTION ONCE
Refills: 0 | Status: COMPLETED | OUTPATIENT
Start: 2025-01-18 | End: 2025-01-18

## 2025-01-18 RX ORDER — ATORVASTATIN CALCIUM 80 MG/1
20 TABLET, FILM COATED ORAL AT BEDTIME
Refills: 0 | Status: DISCONTINUED | OUTPATIENT
Start: 2025-01-18 | End: 2025-01-21

## 2025-01-18 RX ORDER — POTASSIUM CHLORIDE 750 MG/1
20 TABLET, EXTENDED RELEASE ORAL ONCE
Refills: 0 | Status: COMPLETED | OUTPATIENT
Start: 2025-01-18 | End: 2025-01-18

## 2025-01-18 RX ORDER — TAMSULOSIN HYDROCHLORIDE 0.4 MG/1
0.4 CAPSULE ORAL AT BEDTIME
Refills: 0 | Status: DISCONTINUED | OUTPATIENT
Start: 2025-01-18 | End: 2025-01-22

## 2025-01-18 RX ADMIN — Medication 1000 MILLILITER(S): at 13:40

## 2025-01-18 RX ADMIN — Medication 2 PUFF(S): at 15:41

## 2025-01-18 RX ADMIN — Medication 1300 MILLILITER(S): at 13:36

## 2025-01-18 RX ADMIN — SODIUM CHLORIDE 1000 MILLILITER(S): 9 INJECTION, SOLUTION INTRAVENOUS at 15:27

## 2025-01-18 RX ADMIN — Medication 1300 MILLILITER(S): at 14:50

## 2025-01-18 RX ADMIN — Medication 1000 MILLILITER(S): at 12:40

## 2025-01-18 RX ADMIN — DILTIAZEM HYDROCHLORIDE 10 MILLIGRAM(S): 60 TABLET ORAL at 14:57

## 2025-01-18 RX ADMIN — APIXABAN 2.5 MILLIGRAM(S): 5 TABLET, FILM COATED ORAL at 15:40

## 2025-01-18 RX ADMIN — TAMSULOSIN HYDROCHLORIDE 0.4 MILLIGRAM(S): 0.4 CAPSULE ORAL at 21:28

## 2025-01-18 RX ADMIN — DILTIAZEM HYDROCHLORIDE 10 MG/HR: 60 TABLET ORAL at 16:23

## 2025-01-18 RX ADMIN — Medication 5 MILLIGRAM(S): at 14:27

## 2025-01-18 RX ADMIN — POTASSIUM CHLORIDE 20 MILLIEQUIVALENT(S): 750 TABLET, EXTENDED RELEASE ORAL at 17:27

## 2025-01-18 RX ADMIN — DILTIAZEM HYDROCHLORIDE 10 MILLIGRAM(S): 60 TABLET ORAL at 15:45

## 2025-01-18 RX ADMIN — SODIUM CHLORIDE 100 MILLILITER(S): 9 INJECTION, SOLUTION INTRAVENOUS at 17:29

## 2025-01-18 RX ADMIN — SODIUM CHLORIDE 500 MILLILITER(S): 9 INJECTION, SOLUTION INTRAVENOUS at 21:28

## 2025-01-18 NOTE — CONSULT NOTE ADULT - SUBJECTIVE AND OBJECTIVE BOX
Date/Time Patient Seen:  		  Referring MD:   Data Reviewed	       Patient is a 79y old  Male who presents with a chief complaint of shortness of breath (18 Jan 2025 14:05)      Subjective/HPI   History of Present Illness:   HPI:  79-year-old male with history of hyperlipidemia bib EMS presents with complaint of cough, fever, lethargy, body aches, shortness of breath with exertion.  He started having fever, lethargy, cough, and body aches was on 1/13.  States that he was seen at urgent care on 1/15 tested negative for COVID/flu and was prescribed cough and cold medications without improvement.  Started having shortness of breath with exertion 2 days ago and still has mild cough, fever subsided 3 days ago.  He was seen at urgent care today and sent to ER for evaluation.  As per EMS, patient hypoxic at 89-90%RA. Denies recent travel, known sick contacts, abdominal pain, vomiting, diarrhea, chest pain, calf pain/swelling or other symptoms.  No PCP. Unsure of cardiologist name.      PAST MEDICAL & SURGICAL HISTORY:  Benign colonic polyp    Benign essential hypertension    Pure hypercholesterolemia    No Past Surgical History       Review of Systems:  Review of Systems: see HPI, others negative      Allergies and Intolerances:        Allergies:  	penicillins: Drug Category, Other (Mild to Mod), Not anayphylaxis    Home Medications:   * Patient Currently Takes Medications as of 18-Jan-2025 11:57 documented in Structured Notes  · 	atorvastatin 20 mg oral tablet: Last Dose Taken:  , 1 tab(s) orally once a day    .    Patient History:    Past Medical, Past Surgical, and Family History:  PAST MEDICAL HISTORY:  Benign colonic polyp     Benign essential hypertension     Pure hypercholesterolemia.     PAST SURGICAL HISTORY:  No Past Surgical History.     Social History:  · Substance use	No     Tobacco Screening:  · Core Measure Site	No    Risk Assessment:    Present on Admission:  Deep Venous Thrombosis	no  Pulmonary Embolus	no     HIV Screening:  · In accordance with [place] State law, we offer every patient who comes to our ED an HIV test. Would you like to be tested today?	Opt out     Hepatitis C Test Questions:  · In accordance with [place] State Law, we offer every patient a Hepatitis C test. Would you like to be tested today?	Opt out        Medication list         MEDICATIONS  (STANDING):    MEDICATIONS  (PRN):         Vitals log        ICU Vital Signs Last 24 Hrs  T(C): 36.4 (18 Jan 2025 11:52), Max: 36.4 (18 Jan 2025 11:52)  T(F): 97.6 (18 Jan 2025 11:52), Max: 97.6 (18 Jan 2025 11:52)  HR: 150 (18 Jan 2025 14:15) (98 - 150)  BP: 123/75 (18 Jan 2025 14:15) (116/62 - 123/75)  BP(mean): --  ABP: --  ABP(mean): --  RR: 22 (18 Jan 2025 14:15) (22 - 24)  SpO2: 96% (18 Jan 2025 14:15) (90% - 96%)    O2 Parameters below as of 18 Jan 2025 14:15  Patient On (Oxygen Delivery Method): nasal cannula  O2 Flow (L/min): 4               Input and Output:  I&O's Detail      Lab Data                        13.5   20.93 )-----------( 267      ( 18 Jan 2025 12:10 )             40.0     01-18    135  |  98  |  37[H]  ----------------------------<  130[H]  3.4[L]   |  27  |  1.55[H]    Ca    9.6      18 Jan 2025 12:10    TPro  7.3  /  Alb  2.2[L]  /  TBili  2.0[H]  /  DBili  x   /  AST  36  /  ALT  23  /  AlkPhos  110  01-18            Review of Systems	  cough  weakness  malaise  verbal  alert  o2 support  all systems reviewed      Objective     Physical Examination    heart s1s2  lung dc BS  head nc  head at  abd soft  verbal  alert  cn grossly int      Pertinent Lab findings & Imaging      South:  NO   Adequate UO     I&O's Detail           Discussed with:     Cultures:	        Radiology    ACC: [id number] EXAM:  CT CHEST   ORDERED BY: DORIAN GRIER     PROCEDURE DATE:  01/18/2025          INTERPRETATION:  CLINICAL INFORMATION: Shortness of breath    COMPARISON: CT chest 11/5/2012    CONTRAST/COMPLICATIONS:  IV Contrast: NONE  Oral Contrast: NONE  .    PROCEDURE:  CT of the Chest was performed.  Sagittal and coronal reformats were performed.    FINDINGS:    LUNGS AND LARGE AIRWAYS: Small mucosal debris within the posterior   tracheal wall. Mild bronchiectasis. Subsegmental consolidation within the   lingula and posterior left upper lobe with additional associated patchy   groundglass airspace disease and air bronchograms. Segmental and   subsegmental consolidations with adjacent groundglass opacities in the   right upper lobe and right middle lobe. Subsegmental consolidation with   air bronchograms in the medial right lower lobe and superior segment   right lower lobe. Bibasilar compressive atelectasis.  PLEURA: Trace bilateral pleural effusions and/or pleural thickening.  VESSELS: Aortic calcifications. Coronary artery calcifications.  HEART: Heart size is normal. No pericardial effusion.  MEDIASTINUM AND JOHANNA: Prior gastric pull-through surgery. Subcentimeter   mediastinal lymph nodes.  CHEST WALL AND LOWER NECK: Within normal limits.  VISUALIZED UPPER ABDOMEN: Within normal limits.  BONES: Degenerative changes.    IMPRESSION:  Multifocal segmental and subsegmental consolidations with air   bronchograms and surrounding patchy groundglass airspace disease most   consistent with multifocal pneumonia with consideration for aspiration   pneumonia        --- End of Report ---            TERRENCE WATT MD; Attending Radiologist  This document has been electronically signed. Jan 18 2025  2:16PM

## 2025-01-18 NOTE — ED ADULT TRIAGE NOTE - CHIEF COMPLAINT QUOTE
went to urgent care wednesday for flu like s/s and rvp negative and still with cough and sob. never smoked

## 2025-01-18 NOTE — ED PROVIDER NOTE - OBJECTIVE STATEMENT
79-year-old male with history of hyperlipidemia bib EMS presents with complaint of cough, fever, lethargy, body aches, shortness of breath with exertion.  Patient states that he started having tactile fever, lethargy, cough, and bodyaches was on 1/13.  States that he was seen at urgent care on 1/15 tested negative for COVID/flu and was prescribed cough and cold medications without improvement.  States that he started having shortness of breath with exertion 2 days ago and still has mild cough.  States that fever subsided 3 days ago.  Patient states that he was seen at urgent care today and sent to ER for evaluation.  As per EMS, patient hypoxic at 89-90%RA. Denies recent travel, known sick contacts, abdominal pain, vomiting, diarrhea, chest pain, calf pain/swelling or other symptoms. 79-year-old male with history of hyperlipidemia bib EMS presents with complaint of cough, fever, lethargy, body aches, shortness of breath with exertion.  Patient states that he started having tactile fever, lethargy, cough, and body aches was on 1/13.  States that he was seen at urgent care on 1/15 tested negative for COVID/flu and was prescribed cough and cold medications without improvement.  States that he started having shortness of breath with exertion 2 days ago and still has mild cough.  States that fever subsided 3 days ago.  Patient states that he was seen at urgent care today and sent to ER for evaluation.  As per EMS, patient hypoxic at 89-90%RA. Denies recent travel, known sick contacts, abdominal pain, vomiting, diarrhea, chest pain, calf pain/swelling or other symptoms.  No PCP. Unsure of cardiologist name.

## 2025-01-18 NOTE — ED PROVIDER NOTE - MUSCULOSKELETAL, MLM
Spine appears normal, range of motion is not limited, no muscle or joint tenderness, no LE swelling or tenderness B

## 2025-01-18 NOTE — ED PROVIDER NOTE - NSICDXPASTMEDICALHX_GEN_ALL_CORE_FT
PAST MEDICAL HISTORY:  Benign colonic polyp     Benign essential hypertension     Pure hypercholesterolemia

## 2025-01-18 NOTE — H&P ADULT - NSHPLABSRESULTS_GEN_ALL_CORE
Labs:                          13.5   20.93 )-----------( 267      ( 2025 12:10 )             40.0           135  |  98  |  37[H]  ----------------------------<  130[H]  3.4[L]   |  27  |  1.55[H]    Ca    9.6      2025 12:10    TPro  7.3  /  Alb  2.2[L]  /  TBili  2.0[H]  /  DBili  x   /  AST  36  /  ALT  23  /  AlkPhos  110          Urinalysis Basic - ( 2025 13:59 )    Color: Dark Yellow / Appearance: Cloudy / S.026 / pH: x  Gluc: x / Ketone: Negative mg/dL  / Bili: Small / Urobili: 2.0 mg/dL   Blood: x / Protein: 100 mg/dL / Nitrite: Negative   Leuk Esterase: Trace / RBC: x / WBC x   Sq Epi: x / Non Sq Epi: x / Bacteria: x    PT/INR - ( 2025 12:10 )   PT: 14.0 sec;   INR: 1.21 ratio       PTT - ( 2025 12:10 )  PTT:29.4 sec    Lactate Trend   @ 12:10 Lactate:3.0       EKG:   Personally Reviewed:  [x ] YES   NSR, no acute abnormalities    Imaging:   Personally Reviewed:  [x ] YES   cxr -> b/l infiltrates Labs:                          13.5   20.93 )-----------( 267      ( 2025 12:10 )             40.0           135  |  98  |  37[H]  ----------------------------<  130[H]  3.4[L]   |  27  |  1.55[H]    Ca    9.6      2025 12:10    TPro  7.3  /  Alb  2.2[L]  /  TBili  2.0[H]  /  DBili  x   /  AST  36  /  ALT  23  /  AlkPhos  110          Urinalysis Basic - ( 2025 13:59 )    Color: Dark Yellow / Appearance: Cloudy / S.026 / pH: x  Gluc: x / Ketone: Negative mg/dL  / Bili: Small / Urobili: 2.0 mg/dL   Blood: x / Protein: 100 mg/dL / Nitrite: Negative   Leuk Esterase: Trace / RBC: x / WBC x   Sq Epi: x / Non Sq Epi: x / Bacteria: x    PT/INR - ( 2025 12:10 )   PT: 14.0 sec;   INR: 1.21 ratio       PTT - ( 2025 12:10 )  PTT:29.4 sec    Lactate Trend   @ 12:10 Lactate:3.0       EKG:   Personally Reviewed:  [x ] YES   ekg#1 - sinus tach 104   ekg#2 - afib with rvr 153    Imaging:   Personally Reviewed:  [x ] YES   cxr -> b/l infiltrates

## 2025-01-18 NOTE — ED PROVIDER NOTE - PROGRESS NOTE DETAILS
Spoke to Dr. Davila who will consult pt. Pt seen by Dr. Davila in ED who also reviewed CT scan. advised admit to medicine.  Spoke to hospitalist, Dr. Sen who accepted admission.

## 2025-01-18 NOTE — H&P ADULT - NSHPSOCIALHISTORY_GEN_ALL_CORE
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
## 2025-01-18 NOTE — H&P ADULT - NSICDXPASTMEDICALHX_GEN_ALL_CORE_FT
PAST MEDICAL HISTORY:  Benign colonic polyp     Benign essential hypertension     Pure hypercholesterolemia      PAST MEDICAL HISTORY:  Benign colonic polyp     Benign essential hypertension     Other acute pulmonary embolism     Pure hypercholesterolemia

## 2025-01-18 NOTE — ED PROVIDER NOTE - CLINICAL SUMMARY MEDICAL DECISION MAKING FREE TEXT BOX
79-year-old male with history of hyperlipidemia bib EMS presents with complaint of cough, fever, lethargy, body aches, shortness of breath with exertion.  Patient states that he started having tactile fever, lethargy, cough, and body aches was on 1/13.  States that he was seen at urgent care on 1/15 tested negative for COVID/flu and was prescribed cough and cold medications without improvement.  States that he started having shortness of breath with exertion 2 days ago and still has mild cough.  States that fever subsided 3 days ago.  Patient states that he was seen at urgent care today and sent to ER for evaluation.  As per EMS, patient hypoxic at 89-90%RA. Denies recent travel, known sick contacts, abdominal pain, vomiting, diarrhea, chest pain, calf pain/swelling or other symptoms.  No PCP. Unsure of cardiologist name.    VSS Afebrile, NAD  HEENT - clear  PERRL EOMI  Neck supple  lungs dec BS diffusely  Cor S1S2 RR - MGR  Abd soft nontender, no mass or HSM, no rebound  Ext FROM intact, no edema  Neuro Intact, no deficits.  Skin Warm and dry no rash.  Imp- SOB, RO PNA  Plan - CXR, labs, may need amission, IV antibiotics.

## 2025-01-18 NOTE — H&P ADULT - NSHPPHYSICALEXAM_GEN_ALL_CORE
PHYSICAL EXAM:  Vital Signs Last 24 Hrs  T(C): 36.4 (18 Jan 2025 11:52), Max: 36.4 (18 Jan 2025 11:52)  T(F): 97.6 (18 Jan 2025 11:52), Max: 97.6 (18 Jan 2025 11:52)  HR: 98 (18 Jan 2025 12:30) (98 - 107)  BP: 116/62 (18 Jan 2025 11:52) (116/62 - 116/62)  BP(mean): --  RR: 24 (18 Jan 2025 12:56) (24 - 24)  SpO2: 93% (18 Jan 2025 12:56) (90% - 93%)    Parameters below as of 18 Jan 2025 12:56  Patient On (Oxygen Delivery Method): nasal cannula  O2 Flow (L/min): 4      GENERAL: NAD, well-groomed, well-developed, awake, alert, oriented x 3, fluent and coherent speech  EYES: EOMI, conjunctiva and sclera clear  ENMT: No tonsillar erythema, exudates, or enlargement; Moist mucous membranes,  No lesions  NECK: Supple, No JVD, No Cervical LAD, No thyromegaly  NERVOUS SYSTEM:  Good concentration; Moving all 4 extremities against gravity and resistance; No gross sensory deficits, no facial droop  CHEST/LUNG: decreased air entry and b/l crackles  HEART: Regular rate and rhythm; No murmurs, rubs, or gallops  ABDOMEN: Soft, Nontender, Nondistended, Bowel sounds present, no palpable masses or organomegaly, no bruits  EXTREMITIES:  2+ Peripheral Pulses, No clubbing, cyanosis, or edema PHYSICAL EXAM:  Vital Signs Last 24 Hrs  T(C): 36.4 (18 Jan 2025 11:52), Max: 36.4 (18 Jan 2025 11:52)  T(F): 97.6 (18 Jan 2025 11:52), Max: 97.6 (18 Jan 2025 11:52)  HR: 98 (18 Jan 2025 12:30) (98 - 107)  BP: 116/62 (18 Jan 2025 11:52) (116/62 - 116/62)  BP(mean): --  RR: 24 (18 Jan 2025 12:56) (24 - 24)  SpO2: 93% (18 Jan 2025 12:56) (90% - 93%)    Parameters below as of 18 Jan 2025 12:56  Patient On (Oxygen Delivery Method): nasal cannula  O2 Flow (L/min): 4      GENERAL: NAD, well-groomed, well-developed, awake, alert, oriented x 3, fluent and coherent speech, spouse at bedside  EYES: EOMI, conjunctiva and sclera clear  ENMT: No tonsillar erythema, exudates, or enlargement; Moist mucous membranes,  No lesions  NECK: Supple, No JVD, No Cervical LAD, No thyromegaly  NERVOUS SYSTEM:  Good concentration; Moving all 4 extremities against gravity and resistance; No gross sensory deficits, no facial droop  CHEST/LUNG: decreased air entry and b/l crackles  HEART: tachycardic, No murmurs, rubs, or gallops  ABDOMEN: Soft, Nontender, Nondistended, Bowel sounds present, no palpable masses or organomegaly, no bruits  EXTREMITIES:  2+ Peripheral Pulses, No clubbing, cyanosis, or edema
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
## 2025-01-18 NOTE — ED ADULT NURSE NOTE - OBJECTIVE STATEMENT
Pt is alert and oriented. Pt states that he developed flu like symptoms on Sunday. Pt states that he went to urgent care on Wednesday, flu and covid were negative. Pt states that he had a fever on Wednesday but has not had any fevers since. Pt states that he is sob at rest and when ambulating. Pt does not wear O2 at baseline, pts o2 sat was low an arrival, pt placed on 3L NC, pt sating 92%. Pt denies chest pain, nausea, vomiting, and dizziness. Pt resp are even and unlabored, skin color desiree for race. Pt updated on plan of care. Pt placed on cm. Tele tech aware.

## 2025-01-19 LAB
ALBUMIN SERPL ELPH-MCNC: 1.6 G/DL — LOW (ref 3.3–5)
ALP SERPL-CCNC: 104 U/L — SIGNIFICANT CHANGE UP (ref 30–120)
ALT FLD-CCNC: 22 U/L — SIGNIFICANT CHANGE UP (ref 10–60)
ANION GAP SERPL CALC-SCNC: 10 MMOL/L — SIGNIFICANT CHANGE UP (ref 5–17)
AST SERPL-CCNC: 38 U/L — SIGNIFICANT CHANGE UP (ref 10–40)
BILIRUB SERPL-MCNC: 1.4 MG/DL — HIGH (ref 0.2–1.2)
BUN SERPL-MCNC: 23 MG/DL — SIGNIFICANT CHANGE UP (ref 7–23)
CALCIUM SERPL-MCNC: 8.3 MG/DL — LOW (ref 8.4–10.5)
CHLORIDE SERPL-SCNC: 104 MMOL/L — SIGNIFICANT CHANGE UP (ref 96–108)
CO2 SERPL-SCNC: 26 MMOL/L — SIGNIFICANT CHANGE UP (ref 22–31)
CREAT SERPL-MCNC: 1.04 MG/DL — SIGNIFICANT CHANGE UP (ref 0.5–1.3)
CRP SERPL-MCNC: 372 MG/L — HIGH
EGFR: 72 ML/MIN/1.73M2 — SIGNIFICANT CHANGE UP
GLUCOSE SERPL-MCNC: 107 MG/DL — HIGH (ref 70–99)
HCT VFR BLD CALC: 37.3 % — LOW (ref 39–50)
HGB BLD-MCNC: 12.4 G/DL — LOW (ref 13–17)
LACTATE SERPL-SCNC: 1.8 MMOL/L — SIGNIFICANT CHANGE UP (ref 0.7–2)
MAGNESIUM SERPL-MCNC: 1.9 MG/DL — SIGNIFICANT CHANGE UP (ref 1.6–2.6)
MCHC RBC-ENTMCNC: 28.7 PG — SIGNIFICANT CHANGE UP (ref 27–34)
MCHC RBC-ENTMCNC: 33.2 G/DL — SIGNIFICANT CHANGE UP (ref 32–36)
MCV RBC AUTO: 86.3 FL — SIGNIFICANT CHANGE UP (ref 80–100)
NRBC # BLD: 0 /100 WBCS — SIGNIFICANT CHANGE UP (ref 0–0)
NRBC BLD-RTO: 0 /100 WBCS — SIGNIFICANT CHANGE UP (ref 0–0)
PLATELET # BLD AUTO: 246 K/UL — SIGNIFICANT CHANGE UP (ref 150–400)
POTASSIUM SERPL-MCNC: 3.4 MMOL/L — LOW (ref 3.5–5.3)
POTASSIUM SERPL-SCNC: 3.4 MMOL/L — LOW (ref 3.5–5.3)
PROT SERPL-MCNC: 4.8 G/DL — LOW (ref 6–8.3)
RBC # BLD: 4.32 M/UL — SIGNIFICANT CHANGE UP (ref 4.2–5.8)
RBC # FLD: 15.4 % — HIGH (ref 10.3–14.5)
S PNEUM AG UR QL: POSITIVE
SODIUM SERPL-SCNC: 140 MMOL/L — SIGNIFICANT CHANGE UP (ref 135–145)
TSH SERPL-MCNC: 2.34 UIU/ML — SIGNIFICANT CHANGE UP (ref 0.27–4.2)
WBC # BLD: 20.03 K/UL — HIGH (ref 3.8–10.5)
WBC # FLD AUTO: 20.03 K/UL — HIGH (ref 3.8–10.5)

## 2025-01-19 PROCEDURE — 99233 SBSQ HOSP IP/OBS HIGH 50: CPT

## 2025-01-19 RX ORDER — CEFTRIAXONE 250 MG/1
2000 INJECTION, POWDER, FOR SOLUTION INTRAMUSCULAR; INTRAVENOUS EVERY 24 HOURS
Refills: 0 | Status: DISCONTINUED | OUTPATIENT
Start: 2025-01-20 | End: 2025-01-22

## 2025-01-19 RX ORDER — CEFTRIAXONE 250 MG/1
2000 INJECTION, POWDER, FOR SOLUTION INTRAMUSCULAR; INTRAVENOUS ONCE
Refills: 0 | Status: COMPLETED | OUTPATIENT
Start: 2025-01-19 | End: 2025-01-19

## 2025-01-19 RX ORDER — CEFTRIAXONE 250 MG/1
INJECTION, POWDER, FOR SOLUTION INTRAMUSCULAR; INTRAVENOUS
Refills: 0 | Status: DISCONTINUED | OUTPATIENT
Start: 2025-01-19 | End: 2025-01-22

## 2025-01-19 RX ORDER — METOPROLOL SUCCINATE 25 MG
12.5 TABLET, EXTENDED RELEASE 24 HR ORAL
Refills: 0 | Status: DISCONTINUED | OUTPATIENT
Start: 2025-01-19 | End: 2025-01-20

## 2025-01-19 RX ORDER — POTASSIUM CHLORIDE 750 MG/1
40 TABLET, EXTENDED RELEASE ORAL EVERY 4 HOURS
Refills: 0 | Status: COMPLETED | OUTPATIENT
Start: 2025-01-19 | End: 2025-01-19

## 2025-01-19 RX ORDER — IPRATROPIUM BROMIDE AND ALBUTEROL SULFATE .5; 2.5 MG/3ML; MG/3ML
3 SOLUTION RESPIRATORY (INHALATION) EVERY 6 HOURS
Refills: 0 | Status: DISCONTINUED | OUTPATIENT
Start: 2025-01-19 | End: 2025-01-22

## 2025-01-19 RX ADMIN — POTASSIUM CHLORIDE 40 MILLIEQUIVALENT(S): 750 TABLET, EXTENDED RELEASE ORAL at 18:58

## 2025-01-19 RX ADMIN — POTASSIUM CHLORIDE 40 MILLIEQUIVALENT(S): 750 TABLET, EXTENDED RELEASE ORAL at 23:57

## 2025-01-19 RX ADMIN — IPRATROPIUM BROMIDE AND ALBUTEROL SULFATE 3 MILLILITER(S): .5; 2.5 SOLUTION RESPIRATORY (INHALATION) at 09:45

## 2025-01-19 RX ADMIN — Medication 12.5 MILLIGRAM(S): at 18:58

## 2025-01-19 RX ADMIN — CEFTRIAXONE 100 MILLIGRAM(S): 250 INJECTION, POWDER, FOR SOLUTION INTRAMUSCULAR; INTRAVENOUS at 23:53

## 2025-01-19 RX ADMIN — SODIUM CHLORIDE 100 MILLILITER(S): 9 INJECTION, SOLUTION INTRAVENOUS at 02:57

## 2025-01-19 RX ADMIN — TAMSULOSIN HYDROCHLORIDE 0.4 MILLIGRAM(S): 0.4 CAPSULE ORAL at 23:53

## 2025-01-19 RX ADMIN — ATORVASTATIN CALCIUM 20 MILLIGRAM(S): 80 TABLET, FILM COATED ORAL at 23:53

## 2025-01-19 RX ADMIN — DILTIAZEM HYDROCHLORIDE 10 MG/HR: 60 TABLET ORAL at 00:20

## 2025-01-19 RX ADMIN — SODIUM CHLORIDE 100 MILLILITER(S): 9 INJECTION, SOLUTION INTRAVENOUS at 23:49

## 2025-01-19 RX ADMIN — APIXABAN 2.5 MILLIGRAM(S): 5 TABLET, FILM COATED ORAL at 05:43

## 2025-01-19 RX ADMIN — APIXABAN 2.5 MILLIGRAM(S): 5 TABLET, FILM COATED ORAL at 18:58

## 2025-01-19 NOTE — PROGRESS NOTE ADULT - SUBJECTIVE AND OBJECTIVE BOX
Date/Time Patient Seen:  		  Referring MD:   Data Reviewed	       Patient is a 79y old  Male who presents with a chief complaint of shortness of breath (18 Jan 2025 20:18)      Subjective/HPI     PAST MEDICAL & SURGICAL HISTORY:  Benign colonic polyp    Benign essential hypertension    Pure hypercholesterolemia    Other acute pulmonary embolism    No Past Surgical History          Medication list         MEDICATIONS  (STANDING):  apixaban 2.5 milliGRAM(s) Oral every 12 hours  atorvastatin 20 milliGRAM(s) Oral at bedtime  diltiazem Infusion 10 mG/Hr (10 mL/Hr) IV Continuous <Continuous>  lactated ringers. 1000 milliLiter(s) (100 mL/Hr) IV Continuous <Continuous>  tamsulosin 0.4 milliGRAM(s) Oral at bedtime    MEDICATIONS  (PRN):  acetaminophen     Tablet .. 650 milliGRAM(s) Oral every 6 hours PRN Temp greater or equal to 38C (100.4F), Mild Pain (1 - 3)  albuterol    90 MICROgram(s) HFA Inhaler 2 Puff(s) Inhalation every 6 hours PRN Shortness of Breath and/or Wheezing  aluminum hydroxide/magnesium hydroxide/simethicone Suspension 30 milliLiter(s) Oral every 4 hours PRN Dyspepsia  guaiFENesin Oral Liquid (Sugar-Free) 200 milliGRAM(s) Oral every 6 hours PRN Cough         Vitals log        ICU Vital Signs Last 24 Hrs  T(C): 36.6 (19 Jan 2025 04:09), Max: 36.8 (18 Jan 2025 16:53)  T(F): 97.9 (19 Jan 2025 04:09), Max: 98.2 (18 Jan 2025 16:53)  HR: 68 (19 Jan 2025 05:15) (68 - 154)  BP: 101/59 (19 Jan 2025 05:15) (82/71 - 136/72)  BP(mean): 74 (19 Jan 2025 05:15) (69 - 95)  ABP: --  ABP(mean): --  RR: 37 (19 Jan 2025 05:15) (19 - 50)  SpO2: 92% (19 Jan 2025 05:15) (86% - 96%)    O2 Parameters below as of 19 Jan 2025 05:15  Patient On (Oxygen Delivery Method): mask, Venturi    O2 Concentration (%): 60             Input and Output:  I&O's Detail    18 Jan 2025 07:01  -  19 Jan 2025 05:48  --------------------------------------------------------  IN:    Diltiazem: 165 mL    Lactated Ringers: 1200 mL    Lactated Ringers Bolus: 500 mL    Oral Fluid: 360 mL  Total IN: 2225 mL    OUT:    Voided (mL): 675 mL  Total OUT: 675 mL    Total NET: 1550 mL          Lab Data                        13.5   20.93 )-----------( 267      ( 18 Jan 2025 12:10 )             40.0     01-18    135  |  98  |  37[H]  ----------------------------<  130[H]  3.4[L]   |  27  |  1.55[H]    Ca    9.6      18 Jan 2025 12:10    TPro  7.3  /  Alb  2.2[L]  /  TBili  2.0[H]  /  DBili  x   /  AST  36  /  ALT  23  /  AlkPhos  110  01-18    ABG - ( 18 Jan 2025 20:40 )  pH, Arterial: 7.50  pH, Blood: x     /  pCO2: 29    /  pO2: 84    / HCO3: 23    / Base Excess: -0.6  /  SaO2: 97.7                    Review of Systems	      Objective     Physical Examination  heart s1s2  lung dec BS  head nc  head at  abd soft        Pertinent Lab findings & Imaging      Brannon:  NO   Adequate UO     I&O's Detail    18 Jan 2025 07:01  -  19 Jan 2025 05:48  --------------------------------------------------------  IN:    Diltiazem: 165 mL    Lactated Ringers: 1200 mL    Lactated Ringers Bolus: 500 mL    Oral Fluid: 360 mL  Total IN: 2225 mL    OUT:    Voided (mL): 675 mL  Total OUT: 675 mL    Total NET: 1550 mL               Discussed with:     Cultures:	        Radiology

## 2025-01-19 NOTE — CARE COORDINATION ASSESSMENT. - COPING/STRESS CONCERNS
[address] [place] [address]  General Inpatient Hospice Progress Note    Date: 5/6/2021  Name: Chaya Grigsby  MRN: [id number]  YOB: 1950   Patient's PCP: Lucas Pavon MD   Oncology: Dr Marc Matos  Hospitalist: Dr Geneva Wilkes. [facility]  Acute care admit date: 4/26 to 5/3/2021 and 4/10 to 4/21/2021 (6 admissions since 12/1/2020)  Admit Date: 5/4/2021 to General Inpatient Hospice    Subjective: The patient is acutely dyspneic, and has been given Morphine and Lorazepam. The patient ate some yesterday. The patient's friend and support person, Sunil Ortega is at the bedside. I collaborated with the patient's nurse and the hospice nurse. There was a request from the patient's daughter for the patient to return home for end of life care. I talked with the patient, and she said \"not today\". I called the patient's daughter, Bran at [phone] to discuss. We reviewed that the patient is worse this morning, and OK with staying here, but has a goal of wanting to try to get her home but \"doesn't want her to die in transport\". We discussed that would be impossible to predict, but for now will remain here. Objective:   Pain and acute dyspnea is managed with Morphine IV scheduled and prn with 2 prn doses in the past 24 hours (given acute doses now), Glycopyrrolate IV is available for secretions, and Buspar and Lorazepam are available for anxiety. Data reviewed 5/6/2021:  MRI brain 4/13/21:  No intracranial metastatic disease.  Stable mild chronic microvascular disease within the periventricular white matter.  Mild atrophy.      Chest X-ray  5/3/21:  Unchanged right basilar opacity again correlating with patient's known malignancy. Otherwise, no acute cardiopulmonary process      PET scan on October 22, 2020 showed  1. Dominant 1.6 cm nodules in the medial right upper lobe and central right   lower lobe are hypermetabolic and suspicious for malignancy, possibly   synchronous lung cancers.    2. A 3 x 2 cm precarinal merrick mass is intensely hypermetabolic and   consistent with metastatic disease. 3. A smaller 1 cm nodule in the lateral right lower lobe has not   significantly changed since  and demonstrates only minimal uptake,   consistent with benign etiology. 4. Patchy ground-glass opacity throughout the left lung demonstrates mild   uptake and is favored to be infectious or inflammatory. 5. No distant metastatic disease.      Final Cytologic Diagnosis 21:   A. Mediastinal mass, fine needle aspirate with cell block:   -     SMALL CELL CARCINOMA.      B.  Bronchoalveolar lavage with cell block:   -     Few atypical cells seen.      Transthoracic Echocardiogram 21   Technically difficult study; patient intubated.   Left ventricular systolic function is normal.   Ejection fraction is visually estimated at 55%.   Mild left ventricular hypertrophy.   Sclerotic aortic valve with mild aortic stenosis; mean PmmHg.   Mild mitral regurgitation.   Moderate tricuspid regurgitation; RVSP: 42 mmHg.   No evidence of any pericardial effusion.   Dilated IVC and hepatic veins.     CT Chest 4/10/21  Enlarging dominant right lower lobe pulmonary nodule measuring up to 24 mm in diameter previously measuring up to 16 mm in diameter suggesting progression of primary malignancy.   New ground-glass opacity peripheral to the lesion in the right lower lobe that may represent post treatment changes.  This can be seen with radiation pneumonitis. Recommend clinical correlation.   Stable spiculated right apical and right lower lobe smaller pulmonary nodules suggesting stable metastatic disease.   No pulmonary embolism.      ABG:      Recent Labs     21  1345   PH 7.37   PO2ART 63*   KOO4BQI 83.0*   O2SAT 91.4*               Lab Results   Component Value Date     ALKPHOS 86 2021     ALT 20 2021     AST 15 2021     PROT 4.7 2021     PROT 6.5 2015     BILITOT 0.1 2021     BILIDIR 0.1 03/06/2015     IBILI 0.3 03/06/2015     LABALBU 3.0 04/26/2021      CBC with Differential:          Lab Results   Component Value Date     WBC 12.4 04/29/2021     RBC 3.24 04/29/2021     HGB 9.3 04/29/2021     HCT 30.5 04/29/2021      04/29/2021     MCV 94.1 04/29/2021     MCH 28.7 04/29/2021     MCHC 30.5 04/29/2021     RDW 14.4 04/29/2021     NRBC 3 04/26/2021     SEGSPCT 83.0 04/29/2021     BANDSPCT 3 04/29/2021     LYMPHOPCT 10.0 04/29/2021     PROMYELOPCT 1 04/29/2021     MONOPCT 2.0 04/29/2021     MYELOPCT 1 04/29/2021     BASOPCT 0.1 04/17/2021     MONOSABS 0.2 04/29/2021     LYMPHSABS 1.2 04/29/2021     EOSABS 0.1 04/28/2021     BASOSABS 0.0 04/17/2021     DIFFTYPE MANUAL DIFFERENTIAL 04/29/2021      BMP:          Lab Results   Component Value Date      04/27/2021     K 3.6 04/27/2021     CL 97 04/27/2021     CO2 42 04/27/2021     BUN 32 04/27/2021     LABALBU 3.0 04/26/2021     CREATININE 0.6 04/27/2021     CALCIUM 8.4 04/27/2021     GFRAA >60 04/27/2021     LABGLOM >60 04/27/2021     GLUCOSE 73 04/27/2021         Physical Exam:   BP (!) 143/61   Pulse 121   Temp 98.7 °F (37.1 °C) (Oral)   Resp 20   Ht 5' (1.524 m)   Wt 121 lb 4.1 oz (55 kg)   SpO2 92%   BMI 23.68 kg/m²   General: drowsy but arousable, respirations are labored, + facial grimacing, says a few words, chronically ill appearing  Skin: pale, decreased subcutaneous fat  HEENT: Mucous membranes are dry, sclerae are clear, mild conjunctival pallor, + alopecia  Heart: distant tones, tachycardic IRRR, S1S2, no murmurs  Lungs:  Distant coarse breath sounds bilaterally, diminished at the bases, with R>L basilar rales, scattered rhonchi, end expiratory wheeze,   Abdomen: soft, bowel sounds quietly present, no apparent tenderness, nondistended  /rectal: deferred  Extremities:  Feet are warm, no mottling, no pedal edema  Neurologic: lethargic, generally weak,         Assessment/Plan:  1.  Small cell lung cancer of the right lung diagnosed January 2021, with acute superimposed on chronic hypercarbic and hypoxic respiratory failure. The patient has had declining course with multiple admissions, recent mechanical ventilation April 2021. The patient is continued on [address] for the acute management of pain, shortness of breath, congestion, respiratory failure. Continue scheduled Morphine and Lorazepam and continue comfort medications. PPS 10% and prognosis is quite guarded and continues to decline. 2. Advanced COPD  3. Severe anemia of 5.5 gm/dl on admission, transfused PRBC. 4. Paroxysmal atrial fibrillation. Given her declining status and hospice plan of care, will stop anticoagulation, continue rate control meds as able. 5. Possible seizure April 2021 with no metastases reported on MRI brain and EEG with mild diffuse slowing. Seen by Neurology and Levetiracetam stopped. 6. History of hypertension, esophagitis,  hyperlipidemia, AAA  7. DNR-comfort care        Patient Active Problem List   Diagnosis Code    Hyperlipidemia E78.5    COPD (chronic obstructive pulmonary disease) ([facility] J44.9    Leg pain M79.606    Hypertension I10    Shortness of breath R06.02    Tobacco abuse Z72.0    Abdominal aortic aneurysm (AAA) without rupture (HCC) I71.4    Osteopenia M85.80    Anxiety F41.9    Acute on chronic respiratory failure with hypoxia (HCC) J96.21    Lung mass R91.8    Acute exacerbation of chronic obstructive pulmonary disease (COPD) (HCC) J44.1    Small cell lung cancer ([facility] C34.90    Acute on chronic respiratory failure with hypoxia and hypercapnia (HCC) J96.21, J96.22    Acute hypoxemic respiratory failure (HCC) J96.01    Acute hyperkalemia E87.5    Hypernatremia E87.0    SOB (shortness of breath) R06.02    Hospice care patient Z51.5       CEDRIC Jimenez MD  5/6/2021 no concerns

## 2025-01-19 NOTE — CARE COORDINATION ASSESSMENT. - NSCAREPROVIDERS_GEN_ALL_CORE_FT
CARE PROVIDERS:  Accepting Physician: Estephania Sen  Access Services: Timothy Treviño  Admitting: Estephania Sen  Attending: Estephania Sen  Consultant: Ezio Faustin  Consultant: Alex Alba  Consultant: Marleni Ta  Consultant: Jose Davila  Consultant: Gisselle Faustin  ED ACP: Franny Swift  ED Attending: Bora Pollard ED Nurse: Tre Sherwood  Nurse: Nettie Coles  Nurse: Gisella Diaz  Nurse: Lolis Moore  Ordered: Physician, Ordering  Override: Nettie Coles  PCA/Nursing Assistant: Laura Escoto  Primary Team: Ritesh Gordillo  Primary Team: Carter Cee  Primary Team: Jori Ramírez  Respiratory Therapy: Aníbal Last  Team: [facility], Team  Team: [facility]  Hospitalists, Team

## 2025-01-19 NOTE — PROGRESS NOTE ADULT - SUBJECTIVE AND OBJECTIVE BOX
Patient is a 79y old  Male who presents with a chief complaint of shortness of breath (19 Jan 2025 05:48)      INTERVAL HPI/OVERNIGHT EVENTS:    Feels okay. Dry cough. Getting chest PT.    MEDICATIONS  (STANDING):  apixaban 2.5 milliGRAM(s) Oral every 12 hours  atorvastatin 20 milliGRAM(s) Oral at bedtime  diltiazem Infusion 10 mG/Hr (10 mL/Hr) IV Continuous <Continuous>  lactated ringers. 1000 milliLiter(s) (100 mL/Hr) IV Continuous <Continuous>  tamsulosin 0.4 milliGRAM(s) Oral at bedtime    MEDICATIONS  (PRN):  acetaminophen     Tablet .. 650 milliGRAM(s) Oral every 6 hours PRN Temp greater or equal to 38C (100.4F), Mild Pain (1 - 3)  albuterol    90 MICROgram(s) HFA Inhaler 2 Puff(s) Inhalation every 6 hours PRN Shortness of Breath and/or Wheezing  albuterol/ipratropium for Nebulization 3 milliLiter(s) Nebulizer every 6 hours PRN Shortness of Breath and/or Wheezing  aluminum hydroxide/magnesium hydroxide/simethicone Suspension 30 milliLiter(s) Oral every 4 hours PRN Dyspepsia  guaiFENesin Oral Liquid (Sugar-Free) 200 milliGRAM(s) Oral every 6 hours PRN Cough      Allergies    penicillins (Other (Mild to Mod))    Intolerances            Vital Signs Last 24 Hrs  T(C): 36.4 (19 Jan 2025 08:52), Max: 36.8 (18 Jan 2025 16:53)  T(F): 97.6 (19 Jan 2025 08:52), Max: 98.2 (18 Jan 2025 16:53)  HR: 75 (19 Jan 2025 10:00) (66 - 154)  BP: 115/64 (19 Jan 2025 10:00) (82/71 - 136/72)  BP(mean): 80 (19 Jan 2025 10:00) (69 - 95)  RR: 24 (19 Jan 2025 10:00) (18 - 50)  SpO2: 90% (19 Jan 2025 10:00) (86% - 96%)    Parameters below as of 19 Jan 2025 10:00    O2 Flow (L/min): 5      PHYSICAL EXAM:  GENERAL: NAD, well-groomed, well-developed, awake, alert, oriented x 3, fluent and coherent speech, spouse at bedside  EYES: EOMI, conjunctiva and sclera clear  ENMT: No tonsillar erythema, exudates, or enlargement; Moist mucous membranes,  No lesions  NECK: Supple, No JVD, No Cervical LAD, No thyromegaly  NERVOUS SYSTEM:  Good concentration; Moving all 4 extremities against gravity and resistance; No gross sensory deficits, no facial droop  CHEST/LUNG: decreased air entry and b/l crackles  HEART: tachycardic, No murmurs, rubs, or gallops  ABDOMEN: Soft, Nontender, Nondistended, Bowel sounds present, no palpable masses or organomegaly, no bruits  EXTREMITIES:  2+ Peripheral Pulses, No clubbing, cyanosis, or edema    LABS:                        12.4   20.03 )-----------( 246      ( 19 Jan 2025 06:00 )             37.3     19 Jan 2025 06:00    140    |  104    |  23     ----------------------------<  107    3.4     |  26     |  1.04     Ca    8.3        19 Jan 2025 06:00  Mg     1.9       19 Jan 2025 06:00    TPro  4.8    /  Alb  1.6    /  TBili  1.4    /  DBili  x      /  AST  38     /  ALT  22     /  AlkPhos  104    19 Jan 2025 06:00    PT/INR - ( 18 Jan 2025 12:10 )   PT: 14.0 sec;   INR: 1.21 ratio         PTT - ( 18 Jan 2025 12:10 )  PTT:29.4 sec  Urinalysis Basic - ( 19 Jan 2025 06:00 )    Color: x / Appearance: x / SG: x / pH: x  Gluc: 107 mg/dL / Ketone: x  / Bili: x / Urobili: x   Blood: x / Protein: x / Nitrite: x   Leuk Esterase: x / RBC: x / WBC x   Sq Epi: x / Non Sq Epi: x / Bacteria: x      CAPILLARY BLOOD GLUCOSE          RADIOLOGY & ADDITIONAL TESTS:    Imaging Personally Reviewed:  [ ] YES     Consultant(s) Notes Reviewed:      Care Discussed with Consultants/Other Providers:    Advanced Directives: [ ] DNR  [ ] No feeding tube  [ ] MOLST in chart  [ ] MOLST completed today  [ ] Unknown

## 2025-01-19 NOTE — CONSULT NOTE ADULT - SUBJECTIVE AND OBJECTIVE BOX
HPI:  80YO M PMH hyperlipidemia, esophageal CA 20+ years ago, PE after ski accident s/p coumadin (40+ years ago) recent URI symptoms seen at urgent care on 1/15 tested negative for COVID/flu and was prescribed cough and cold medications without improvement who presented to the hospital with c/o worsening SOB cough, fever, lethargy, body aches, Denies CP n/v/d abd pain urinary symptoms. No recent travel or sick contacts. In ED afebrile WBD 20K with bandemia KARIE  CT chest with multifocal pneumonia.     Infectious Disease consult was called to evaluate pt and for antibiotic management.    Past Medical & Surgical Hx:  PAST MEDICAL & SURGICAL HISTORY:  Benign colonic polyp  Benign essential hypertension  Pure hypercholesterolemia  Other acute pulmonary embolism    Social History--  EtOH: denies   Tobacco: denies  Drug Use: denies    FAMILY HISTORY:  Noncontributory    Allergies  penicillins (Other (Mild to Mod))    Intolerances  NONE      Home Medications:  atorvastatin 20 mg oral tablet: 1 tab(s) orally once a day (18 Jan 2025 14:08)      Current Inpatient Medications :    ANTIBIOTICS:      OTHER RELEVANT MEDICATIONS :  acetaminophen     Tablet .. 650 milliGRAM(s) Oral every 6 hours PRN  albuterol    90 MICROgram(s) HFA Inhaler 2 Puff(s) Inhalation every 6 hours PRN  albuterol/ipratropium for Nebulization 3 milliLiter(s) Nebulizer every 6 hours PRN  aluminum hydroxide/magnesium hydroxide/simethicone Suspension 30 milliLiter(s) Oral every 4 hours PRN  apixaban 2.5 milliGRAM(s) Oral every 12 hours  atorvastatin 20 milliGRAM(s) Oral at bedtime  guaiFENesin Oral Liquid (Sugar-Free) 200 milliGRAM(s) Oral every 6 hours PRN  lactated ringers. 1000 milliLiter(s) IV Continuous <Continuous>  metoprolol tartrate 12.5 milliGRAM(s) Oral two times a day  potassium chloride    Tablet ER 40 milliEquivalent(s) Oral every 4 hours  tamsulosin 0.4 milliGRAM(s) Oral at bedtime      ROS:  CONSTITUTIONAL:  Negative fever or chills  EYES:  Negative  blurry vision or double vision  CARDIOVASCULAR:  Negative for chest pain or palpitations  RESPIRATORY:  Negative for cough, wheezing, or SOB   GASTROINTESTINAL:  Negative for nausea, vomiting, diarrhea, constipation, or abdominal pain  GENITOURINARY:  Negative frequency, urgency , dysuria or hematuria   NEUROLOGIC:  No headache, confusion, dizziness, lightheadedness  All other systems were reviewed and are negative          I&O's Detail    18 Jan 2025 07:01  -  19 Jan 2025 07:00  --------------------------------------------------------  IN:    Diltiazem: 170 mL    Lactated Ringers: 1200 mL    Lactated Ringers Bolus: 500 mL    Oral Fluid: 420 mL  Total IN: 2290 mL    OUT:    Voided (mL): 850 mL  Total OUT: 850 mL    Total NET: 1440 mL      19 Jan 2025 07:01  -  19 Jan 2025 23:08  --------------------------------------------------------  IN:    Lactated Ringers: 1400 mL    Oral Fluid: 240 mL  Total IN: 1640 mL    OUT:    Diltiazem: 0 mL    Voided (mL): 425 mL  Total OUT: 425 mL    Total NET: 1215 mL    Physical Exam:  Vital Signs Last 24 Hrs  T(C): 36.8 (19 Jan 2025 20:43), Max: 37.4 (19 Jan 2025 15:29)  T(F): 98.3 (19 Jan 2025 20:43), Max: 99.3 (19 Jan 2025 15:29)  HR: 88 (19 Jan 2025 18:00) (66 - 118)  BP: 126/74 (19 Jan 2025 18:00) (84/64 - 126/74)  BP(mean): 91 (19 Jan 2025 18:00) (72 - 93)  RR: 33 (19 Jan 2025 18:00) (18 - 47)  SpO2: 91% (19 Jan 2025 18:00) (89% - 96%)    Parameters below as of 19 Jan 2025 12:00  Patient On (Oxygen Delivery Method): nasal cannula  O2 Flow (L/min): 4        General: no acute distress  Neck: supple, trachea midline  Lungs: rhonchi  Cardiovascular: regular rate and rhythm, S1 S2  Abdomen: soft, nontender, ND, bowel sounds normal  Neurological:  alert and oriented x3  Skin: no rash  Extremities: no edema    Labs:                         12.4   20.03 )-----------( 246      ( 19 Jan 2025 06:00 )             37.3   01-19    140  |  104  |  23  ----------------------------<  107[H]  3.4[L]   |  26  |  1.04    Ca    8.3[L]      19 Jan 2025 06:00  Mg     1.9     01-19    TPro  4.8[L]  /  Alb  1.6[L]  /  TBili  1.4[H]  /  DBili  x   /  AST  38  /  ALT  22  /  AlkPhos  104  01-19    Urine Microscopic-Add On (NC) (01.18.25 @ 13:59)    Red Blood Cell - Urine: 25 /HPF   White Blood Cell - Urine: 10 /HPF   Bacteria: Moderate /HPF   Squamous Epithelial Cells: Present  Urinalysis with Rflx Culture (01.18.25 @ 13:59)    Urine Appearance: Cloudy   Color: Dark Yellow   Specific Gravity: 1.026   pH Urine: 5.0   Protein, Urine: 100 mg/dL   Glucose Qualitative, Urine: Negative mg/dL   Ketone - Urine: Negative mg/dL   Blood, Urine: Large   Bilirubin: Small   Urobilinogen: 2.0 mg/dL   Leukocyte Esterase Concentration: Trace   Nitrite: Negative    RECENT CULTURES:  Culture - Blood (collected 18 Jan 2025 12:10)  Source: .Blood BLOOD  Preliminary Report (19 Jan 2025 17:01):    No growth at 24 hours    Culture - Blood (collected 18 Jan 2025 12:10)  Source: .Blood BLOOD  Preliminary Report (19 Jan 2025 17:01):    No growth at 24 hours    RADIOLOGY & ADDITIONAL STUDIES:    ACC: [id number] EXAM:  CT CHEST   ORDERED BY: DORIAN GRIER     PROCEDURE DATE:  01/18/2025          INTERPRETATION:  CLINICAL INFORMATION: Shortness of breath    COMPARISON: CT chest 11/5/2012    CONTRAST/COMPLICATIONS:  IV Contrast: NONE  Oral Contrast: NONE  .    PROCEDURE:  CT of the Chest was performed.  Sagittal and coronal reformats were performed.    FINDINGS:    LUNGS AND LARGE AIRWAYS: Small mucosal debris within the posterior   tracheal wall. Mild bronchiectasis. Subsegmental consolidation within the   lingula and posterior left upper lobe with additional associated patchy   groundglass airspace disease and air bronchograms. Segmental and   subsegmental consolidations with adjacent groundglass opacities in the   right upper lobe and right middle lobe. Subsegmental consolidation with   air bronchograms in the medial right lower lobe and superior segment   right lower lobe. Bibasilar compressive atelectasis.  PLEURA: Trace bilateral pleural effusions and/or pleural thickening.  VESSELS: Aortic calcifications. Coronary artery calcifications.  HEART: Heart size is normal. No pericardial effusion.  MEDIASTINUM AND JOHANNA: Prior gastric pull-through surgery. Subcentimeter   mediastinal lymph nodes.  CHEST WALL AND LOWER NECK: Within normal limits.  VISUALIZED UPPER ABDOMEN: Within normal limits.  BONES: Degenerative changes.    IMPRESSION:  Multifocal segmental and subsegmental consolidations with air   bronchograms and surrounding patchy groundglass airspace disease most   consistent with multifocal pneumonia with consideration for aspiration   pneumonia    Assessment :   80YO M PMH hyperlipidemia, esophageal CA 20+ years ago, PE after ski accident s/p coumadin (40+ years ago) recent URI symptoms seen at urgent care on 1/15 tested negative for COVID/flu and was prescribed cough and cold medications without improvement who presented to the hospital with c/o worsening SOB cough, fever, lethargy, body aches admitted with multifocal pneumonia. Afebrile WBC 20K with bandemia   KARIE resolved    Plan :   Change Levaquin to Rocephin  Trend temps and cbc  Legionella and Strep pna antigen  Fu cultures  Pulm toileting  Asp precautions      Advance Directives- Full code  Current Medications are documented.   Drug-drug interactions reviewed.    Continue with present regiment .  Approptiate use of antibiotics and adverse effects reviewed.      I have discussed the above plan of care with patient/family in detail. They expressed understanding of the treatment plan . Risks, benefits and alternatives discussed in detail. I have asked if they have any questions or concerns and appropriately addressed them to the best of my ability .      > 45 minutes spent in direct patient care reviewing  the notes, lab data/ imaging , discussion with multidisciplinary team. All questions were addressed and answered to the best of my capacity .    Thank you for allowing me to participate in the care of your patient .      Marleni Ta MD  Infectious Disease  [phone]

## 2025-01-19 NOTE — CARE COORDINATION ASSESSMENT. - NSDCPLANSERVICES_GEN_ALL_CORE
79M with HLD, esophageal cancer (20+ years ago), PE after ski accident s/p warfarin (40+ years ago) presents on 1/18 with SOB.  CM met with patient and his wife at bedside. CM introduced self and role through out hospitalization. CM provided resource folder and CM contact information. Patient states he feels better at this time. Patient lives in a private house with his wife, 10 steps staggered to enter and 10 steps to navigate with in the home. Prior to admission patient is was ind in adls. Patient denies any services and or equipment at this time. Homecare referral discussed and pt states he wants to see how he feels and make decision. Patient has PCP Lisa Josue in [place] but would like someone closer. CM will provided resources. Anticipated needs unclear at this time. CM will cont to be available. Patient states his wife will be available to assist at home as needed.   [facility]acy[facility]/Anticipated Needs Unclear at Present

## 2025-01-19 NOTE — CONSULT NOTE ADULT - SUBJECTIVE AND OBJECTIVE BOX
CARDIOLOGY CONSULT NOTE    Patient is a 79y Male with a known history of :    HPI:  HPI:  79-year-old male with history of hyperlipidemia, esophageal CA 20+ years ago, PE after ski accident s/p coumadin (40+ years ago)  bib EMS presents with complaint of palpitations with exertion (excessive for him when going up the stairs), cough, fever, lethargy, body aches, shortness of breath with exertion.  He started having fever, lethargy, cough, and body aches was on .  States that he was seen at urgent care on 1/15 tested negative for COVID/flu and was prescribed cough and cold medications without improvement.  Started having shortness of breath with exertion 2 days ago and still has mild cough, fever subsided 3 days ago.  He was seen at urgent care today and sent to ER for evaluation.  As per EMS, patient hypoxic at 89-90%RA. Denies recent travel, known sick contacts, abdominal pain, vomiting, diarrhea, chest pain, calf pain/swelling or other symptoms.  No PCP. Sees Dr. Cuco Cook for Cardiology due to family hx of heart disease  (father  of stroke in his 50s, multiple siblings with heart disease).     (2025 14:05)      REVIEW OF SYSTEMS:    CONSTITUTIONAL: No fever, weight loss, or fatigue  EYES: No eye pain, visual disturbances, or discharge  ENMT:  No difficulty hearing, tinnitus, vertigo; No sinus or throat pain  NECK: No pain or stiffness  BREASTS: No pain, masses, or nipple discharge  RESPIRATORY: No cough, wheezing, chills or hemoptysis; No shortness of breath  CARDIOVASCULAR: No chest pain, palpitations, dizziness, or leg swelling  GASTROINTESTINAL: No abdominal or epigastric pain. No nausea, vomiting, or hematemesis; No diarrhea or constipation. No melena or hematochezia.  GENITOURINARY: No dysuria, frequency, hematuria, or incontinence  NEUROLOGICAL: No headaches, memory loss, loss of strength, numbness, or tremors  SKIN: No itching, burning, rashes, or lesions   LYMPH NODES: No enlarged glands  ENDOCRINE: No heat or cold intolerance; No hair loss  MUSCULOSKELETAL: No joint pain or swelling; No muscle, back, or extremity pain  PSYCHIATRIC: No depression, anxiety, mood swings, or difficulty sleeping  HEME/LYMPH: No easy bruising, or bleeding gums  ALLERGY AND IMMUNOLOGIC: No hives or eczema    MEDICATIONS  (STANDING):  apixaban 2.5 milliGRAM(s) Oral every 12 hours  atorvastatin 20 milliGRAM(s) Oral at bedtime  diltiazem Infusion 10 mG/Hr (10 mL/Hr) IV Continuous <Continuous>  lactated ringers. 1000 milliLiter(s) (100 mL/Hr) IV Continuous <Continuous>  metoprolol tartrate 12.5 milliGRAM(s) Oral two times a day  potassium chloride    Tablet ER 40 milliEquivalent(s) Oral every 4 hours  tamsulosin 0.4 milliGRAM(s) Oral at bedtime    MEDICATIONS  (PRN):  acetaminophen     Tablet .. 650 milliGRAM(s) Oral every 6 hours PRN Temp greater or equal to 38C (100.4F), Mild Pain (1 - 3)  albuterol    90 MICROgram(s) HFA Inhaler 2 Puff(s) Inhalation every 6 hours PRN Shortness of Breath and/or Wheezing  albuterol/ipratropium for Nebulization 3 milliLiter(s) Nebulizer every 6 hours PRN Shortness of Breath and/or Wheezing  aluminum hydroxide/magnesium hydroxide/simethicone Suspension 30 milliLiter(s) Oral every 4 hours PRN Dyspepsia  guaiFENesin Oral Liquid (Sugar-Free) 200 milliGRAM(s) Oral every 6 hours PRN Cough      ALLERGIES: penicillins (Other (Mild to Mod))      FAMILY HISTORY:      Social History:  Alochol:   Smoking:   Drug Use:   Marital Status:     I&O's Detail    2025 07:01  -  2025 07:00  --------------------------------------------------------  IN:    Diltiazem: 165 mL    Lactated Ringers: 1200 mL    Lactated Ringers Bolus: 500 mL    Oral Fluid: 420 mL  Total IN: 2285 mL    OUT:    Voided (mL): 850 mL  Total OUT: 850 mL    Total NET: 1435 mL          PHYSICAL EXAMINATION:  -----------------------------  T(C): 36.4 (25 @ 08:52), Max: 36.8 (25 @ 16:53)  HR: 75 (25 @ 10:00) (66 - 154)  BP: 115/64 (25 @ 10:00) (82/71 - 136/72)  RR: 24 (25 @ 10:00) (18 - 50)  SpO2: 90% (25 @ 10:00) (86% - 96%)  Wt(kg): --     @ 07:  -   07:00  --------------------------------------------------------  IN:    Diltiazem: 165 mL    Lactated Ringers: 1200 mL    Lactated Ringers Bolus: 500 mL    Oral Fluid: 420 mL  Total IN: 2285 mL    OUT:    Voided (mL): 850 mL  Total OUT: 850 mL    Total NET: 1435 mL        Height (cm): 172.7 ( @ 11:52)  Weight (kg): 74.8 ( 11:52)  BMI (kg/m2): 25.1 (:52)  BSA (m2): 1.88 ( 11:52)    Constitutional: well developed, normal appearance, well groomed, well nourished, no deformities and no acute distress.   Eyes: the conjunctiva exhibited no abnormalities and the eyelids demonstrated no xanthelasmas.   HEENT: normal oral mucosa, no oral pallor and no oral cyanosis.   Neck: normal jugular venous A waves present, normal jugular venous V waves present and no jugular venous anderson A waves.   Pulmonary: no respiratory distress, normal respiratory rhythm and effort, no accessory muscle, to auscultation B/L basilar rales R>L  Cardiovascular: heart rate and rhythm were normal, normal S1 and S2 and no murmur, gallop, rub, heave or thrill are present.   Musculoskeletal: the gait could not be assessed.   Extremities: no clubbing of the fingernails, no localized cyanosis, no petechial hemorrhages and no ischemic changes.   Skin: normal skin color and pigmentation, no rash, no venous stasis, no skin lesions, no skin ulcer and no xanthoma was observed.   Psychiatric: oriented to person, place, and time, the affect was normal, the mood was normal and not feeling anxious.     LABS:   --------      140  |  104  |  23  ----------------------------<  107[H]  3.4[L]   |  26  |  1.04    Ca    8.3[L]      2025 06:00  Mg     1.9         TPro  4.8[L]  /  Alb  1.6[L]  /  TBili  1.4[H]  /  DBili  x   /  AST  38  /  ALT  22  /  AlkPhos  104                           12.4   20.03 )-----------( 246      ( 2025 06:00 )             37.3     PT/INR - ( 2025 12:10 )   PT: 14.0 sec;   INR: 1.21 ratio         PTT - ( 2025 12:10 )  PTT:29.4 sec              RADIOLOGY:  -----------------    < from: TTE W or WO Ultrasound Enhancing Agent (25 @ 18:44) >    TRANSTHORACIC ECHOCARDIOGRAM REPORT  ________________________________________________________________________________                                      _______       Pt. Name:       CALLI DAVIS Study Date:    2025  MRN:            [id number]       YOB: 1945  Accession #:    [id number]       Age:           79 years  Account#:       [id number]    Gender:        M  Heart Rate:                     Height:        68.00 in (172.72 cm)  Rhythm:                         Weight:        167.50 lb (75.98 kg)  Blood Pressure: 121/67 mmHg     BSA/BMI:       1.90 m² / 25.47 kg/m²  ________________________________________________________________________________________  Referring Physician:    [id number] Estephania Sen  Interpreting Physician: Venugopal Palla MD  Primary Sonographer:    Woody Kinney    CPT:               MYOCARDIAL STRAIN IMAGING - 51088.m;ECHO TTE WO CON COMP W                     DOPP - 57606.m  Indication(s):     Abnormal electrocardiogram ECG EKG - R94.31  Procedure:         Transthoracic echocardiogram with 2-D, M-mode and complete                     spectral and color flow Doppler. Color Doppler performed.                     Spectral Doppler performed. Strain imaging performed for             evaluation of regional and global myocardial shape and                     dimensions.  Ordering Location: ED  Admission Status:  Inpatient  Study Information: Image quality for this study is adequate.    _______________________________________________________________________________________     CONCLUSIONS:      1. Left ventricular cavity is small. Left ventricular wall thickness is mildly increased. Left ventricular systolic function is normal with an ejection fraction visually estimated at 60 to 65 %. There are no regional wall motion abnormalities seen.   2. Normal right ventricular cavity size, with normal wall thickness, and normal right ventricular systolic function.   3. Left atrium is mildly dilated.   4. Tricuspid aortic valve with normal leaflet excursion with normal systolic excursion. There is calcification of the aortic valve leaflets.   5. Mild aortic regurgitation.   6. Trace pulmonic regurgitation.   7. Mitral valve leaflets are diffusely calcified.   8. Mildmitral regurgitation.   9. Mild to moderate tricuspid regurgitation.  10. Estimated pulmonary artery systolic pressure is 41 mmHg, consistent with mild pulmonary hypertension.  11. Small pericardial effusion noted adjacent to the posterior left ventricle.    ________________________________________________________________________________________  FINDINGS:     Left Ventricle:  The left ventricular cavity is small. Left ventricular wall thickness is mildly increased. Left ventricular systolic function is normal with an ejection fraction visually estimated at 60 to 65%. There are no regional wall motion abnormalities seen. Normal left ventricular filling pressure. Analysis of left ventricular diastolic function and filling pressure is made challenging by the presence of tachycardia.     Right Ventricle:  The right ventricular cavity is normal in size, with normal wall thickness and right ventricular systolic function is normal. Tricuspid annular plane systolic excursion (TAPSE) is 2.0 cm (normal >=1.7 cm). Tricuspid annular tissue Doppler S' is 2.0 cm/s (normal >10 cm/s).     Left Atrium:  The left atrium is mildly dilated. Something sticking out.     Right Atrium:  The right atrium is normal in size with an indexed area of 7.17 cm²/m².     Interatrial Septum:  The interatrial septum appears intact.     Aortic Valve:  The aortic valve is tricuspid with normal leaflet excursion with normal systolic excursion. There is calcification of the aortic valve leaflets. There is no aortic valve stenosis. There is mild aortic regurgitation. AI VMax is 2.85 m/s. AI pressure half time is 373 msec. AI slope is 2.24 m/s².     Mitral Valve:  Structurally normal mitral valve with normal leaflet excursion. There is mitral valve thickening of the anterior and posterior leaflets. There is normal leaflet mobility of the mitral valve. There is mild calcification of the mitral valve annulus. Mitral valve leaflets are diffusely calcified. There is no mitral valve stenosis. There is mild mitral regurgitation.     Tricuspid Valve:  Structurally normal tricuspid valve with normal leaflet excursion. Tricuspid valve leaflets appear mildly thickened. There is mild to moderate tricuspid regurgitation. Estimated pulmonary artery systolic pressure is 41mmHg, consistent with mild pulmonary hypertension.     Pulmonic Valve:  Structurally normal pulmonic valve with normal leaflet excursion. There is no pulmonic valve stenosis. There is trace pulmonic regurgitation.     Aorta:  The aortic root appears normal in size. The aortic root at the sinuses of Valsalva is normal in size, measuring 3.50 cm (indexed 1.85 cm/m²). The ascending aorta is normal in size, measuring 3.20 cm (indexed 1.69 cm/m²).     Pericardium:  There is a small pericardial effusion noted adjacent to the posterior left ventricle.     Pleura:  Small left pleural effusion noted.     Systemic Veins:  The inferior vena cava is normal in size measuring 1.30 cm in diameter, (normal <2.1cm) with normal inspiratory collapse (normal >50%) consistent with normal right atrial pressure (~3, range 0-5mmHg).  ____________________________________________________________________  QUANTITATIVE DATA:  Left Ventricle Measurements: (Indexed to BSA)     IVSd (2D):   1.1 cm  LVPWd (2D):  1.1 cm  LVIDd (2D):  3.2 cm  LVIDs (2D):  2.1 cm  LV Mass:     104 g  55.0 g/m²  Visualized LV EF%: 60 to 65%     MV E Vmax:    1.08 m/s  MV A Vmax:    1.06 m/s  MV E/A:       1.02  e' lateral:   13.50 cm/s  e' medial:    12.30 cm/s  E/e' lateral: 8.00  E/e'medial:  8.78  E/e' Average: 8.37  MV DT:        165 msec    Aorta Measurements: (Normal range) (Indexed to BSA)     Ao Root d    3.50 cm (3.1 - 3.7 cm) 1.85 cm/m²  Ao Asc prox: 3.20 cm                1.69 cm/m²            Left Atrium Measurements: (Indexed to BSA)  LA Diam 2D:        3.70 cm  LA Vol s, MOD A4C: 56.30 ml.  LA Vol s, MOD A2C: 50.90 ml.         Right Ventricle Measurements: Right Atrial Measurements:     TAPSE:            2.0 cm      RA Vol s, MOD A4C  13.6 ml  RV S' Vmax:       28.10 cm/s  RA Area s, MOD A4C 8.6 cm²  RV Base (RVID1):  2.4 cm  RV Mid (RVID2):   2.3 cm  RV Major (RVID3): 7.0 cm       LVOT / RVOT/ Qp/Qs Data: (Indexed to BSA)  LVOT Vmax:      1.12 m/s  LVOT Vmn:       0.768 m/s  LVOT VTI:       13.40 cm  LVOT peak grad: 5 mmHg  LVOT mean grad: 3.0 mmHg    Aortic Valve Measurements:  AV Mean Gradient:       9.0 mmHg  AV VTI:                 22.6 cm  AV VTI Ratio:           0.59  AoV Dimensionless Index 0.59  AR Vmax                 2.85 m/s  AR PHT     373 msec  AR [place]                2.24 m/s²    Mitral Valve Measurements:     MV E Vmax: 1.1 m/s         MR Vmax:          4.35 m/s  MV A Vmax: 1.1 m/s         MR Peak Gradient: 75.7 mmHg  MV E/A:    1.0       Tricuspid Valve Measurements:     TV S'             2.0 cm/s  TR Vmax:          3.1 m/s  TR Peak Gradient: 37.9 mmHg  RA Pressure:      3 mmHg  PASP:             41 mmHg       Pulmonic Valve Measurments:  PV Vmax:          1.1 m/s  PV Peak Gradient: 5.2 mmHg    ________________________________________________________________________________________  Electronically signed on 2025 at 8:49:32 PM by Venugopal Palla MD         *** Final ***    < end of copied text >      EC25 10:59 am Sinus tachycardia, APC's, LAD/LAHB          25 12:06 pm Sinus tachycardia, APC's, LAD/LAHB          25 2:14 pm A-Fib at 153/minute, LAD/LAHB

## 2025-01-19 NOTE — CARE COORDINATION ASSESSMENT. - NSPASTMEDSURGHISTORY_GEN_ALL_CORE_FT
PAST MEDICAL & SURGICAL HISTORY:  Pure hypercholesterolemia      Benign essential hypertension      Benign colonic polyp      No Past Surgical History      Other acute pulmonary embolism

## 2025-01-19 NOTE — PROGRESS NOTE ADULT - ASSESSMENT
79M with HLD, esophageal cancer (20+ years ago), PE after ski accident s/p warfarin (40+ years ago) presents on 1/18 with SOB.    #Severe Sepsis secondary to B/L Community Acquired Pneumonia with acute hypoxic respiratory failure: on IV levofloxacin 750mg daily. Wean O2 as tolerated. Appreciate pulmonology input. Trial of duonebs.    #KARIE: due to sepsis. On IVF. Improved renal function.    #Sinus Tachycardia with intermittent Atrial Fibrillation triggered by respiratory disease: likely due to sepsis. Echocardiogram obtained. Received IV metoprolol. Ordered for IV diltiazem. Cardiology consult. CHADS2-VASc 3 (had HTN long time ago). On apixaban 5mg bid. He has significant family hx of vascular disease (heart disease in siblings, father stroke in his 50s), no personal hx of established CAD or stents, had stress test last year with Dr. Cuco Cook    Echocardiogram  CONCLUSIONS:   1. Left ventricular cavity is small. Left ventricular wall thickness is mildly increased. Left ventricular systolic function is normal with an ejection fraction visually estimated at 60 to 65 %. There are no regional wall motion abnormalities seen.   2. Normal right ventricular cavity size, with normal wall thickness, and normal right ventricular systolic function.   3. Left atrium is mildly dilated.   4. Tricuspid aortic valve with normal leaflet excursion with normal systolic excursion. There is calcification of the aortic valve leaflets.   5. Mild aortic regurgitation.   6. Trace pulmonic regurgitation.   7. Mitral valve leaflets are diffusely calcified.   8. Mild mitral regurgitation.   9. Mild to moderate tricuspid regurgitation.  10. Estimated pulmonary artery systolic pressure is 41 mmHg, consistent with mild pulmonary hypertension.  11. Small pericardial effusion noted adjacent to the posterior left ventricle.    #chronic issues  -HLD: atorvastatin 20mg nightly  -BPH: tamsulosin 0.4mg daily    Code: FULL  Diet: regular  DVT ppx: apixaban  Dispo: inpatient; home once medically ready in 2-3 days

## 2025-01-19 NOTE — CONSULT NOTE ADULT - ASSESSMENT
79-year-old male with history of hyperlipidemia bib EMS presents with complaint of cough, fever, lethargy, body aches, shortness of breath with exertion.  He started having fever, lethargy, cough, and body aches was on 1/13.  States that he was seen at urgent care on 1/15 tested negative for COVID/flu and was prescribed cough and cold medications without improvement.    hiatal hernia  gerd  pna  LRTI  malaise  HTN  weakness  HLD      PPI  cvs rx regimen  BP control  wean fio2 as tolerated - goal sat > 88 pct  I kayla for atelectasis and hypoxemia  cough rx regimen - sx management in LRTI  biomarkers - cx - sputum cx -   emp ABX for LRTI - PNA - CAP  CT chest reviewed - ggo - bronchiectasis - pna - atelectasis - hx of gastric surgery  nutrition - hydration  monitor VS and HD and Sat  spoke with family on admission  DVT p  
78y/o seen at [facility]. Son and Daughter in-law at bedside  History HTN, high cholesterol, pulmonary embolus  Esophageal cancer s/p surgery    Admitted for some mild cough, intermittent fevers, lethargy, palpitations and shortness of breath with exertion  No prior cardiac history nor symptoms  Treated with Metoprolol and Cardizem  WBC-20.03  Troponin-9.6     BNP-445  At 1/19/25 4:45 am patient converted to NSR  Cardizem drip stopped  Presently patient is feeling better    Impression  Multi-focal pneumonia  PAF converted to NSR    Plan:  - Acute MI ruled-out with normal troponin and no significant ST changes on EKG  - Continue Apixaban-2.5mg BID                  Atorvastatin-20mg HS  - No evidence for decompensated heart failure on exam  - 1/18/25 Echocardiogram with normal LV. Mildly enlarged LA with mild MR         Mild AI. Mild to moderate TI and PASP-41mmHg         Small pericardial effusion  - Will start low dose Metoprolol tartrate at 12.5mg BID with parameters and titrate as needed  - Chest CAT scan with multi-focal pneumonia  - On Levaquin  - Seen by Pulmonary
Assessment: 79-year-old male with history of hyperlipidemia, esophageal CA 20+ years ago, PE after ski accident s/p coumadin (40+ years ago) who was BIBEMS presents with complaint of palpitations with exertion (excessive for him when going up the stairs), cough, fever, lethargy, body aches, shortness of breath with exertion admitted for sepsis 2/2 CAP PNA and A fib RVR.    Sepsis  Leukocytosis  Lactic acidosis  CAP PNA  KARIE  A fib RVR  AHRF      Plan:   -Neuro: mentating well, tylenol for analgesia or fever PRN. Avoid narcotics for deliriogenic effects or respiratory depression.  -CV: A fib RVR s/p IVP Cardizem 10 mg x2, IVP Lopressor 5 mg x1. Now on cardizem gtt. Actively titrating cardizem gtt to sustain HR < 130. Will consider amiodarone bolus if sustained A fib RVR despite cardizem gtt. Likely exacerbated from hypoxia and CAP PNA. TTE noting LVEF 6t0-65% with calcified mitral and aortic valves. Mild to mod TR. Small pericardial effusion. Continue home statin.  -Pulm: AHRF on NC with SO2 in 80s, will upgrade to venti mask 40%. Follow up ABG ordered. CT chest noting groundglass opacities for multifocal CAP PNA. Actively titrate supplemental O2 to maintain SO2>92 for adequate tissue oxygenation. Albuterol PRN for SOB or wheezing.  -Renal: KARIE possibly ATN. Lactic acidosis downtrending s/p 3.3 L IVF. On maintenance LR. Will give additional  cc bolus given soft BPs. Will further trend lactic levels. Trend Scr daily. Avoid nephrotoxic meds. Monitor I/Os to maintain UOP>05 cc/kg/hr. Replete lytes to maintain K>4, Mg>2, Phos>3.  -GI: DASH diet  -ID: Leukocytosis likely from CAP PNA, trend WBC and fever curves. Follow up cultures. On empiric Levaquin ABX.  -Heme: On Eliquis for full AC 2/2 A fib. H&H stable, transfuse if Hgb < 7.  -Endo: BG goal 110-180 with fingersticks PRN.        CRITICAL CARE TIME SPENT: 40 minutes  Time spent evaluating/treating patient with medical issues that pose a high risk for life threatening deterioration, and/or end-organ damage, reviewing data/labs/imaging, discussing case with multidisciplinary team, discussing plan/goals of care with patient/family. Non-inclusive of procedure time. Date of entry of this note is equal to the date of services rendered.

## 2025-01-19 NOTE — PROGRESS NOTE ADULT - ASSESSMENT
79-year-old male with history of hyperlipidemia bib EMS presents with complaint of cough, fever, lethargy, body aches, shortness of breath with exertion.  He started having fever, lethargy, cough, and body aches was on 1/13.  States that he was seen at urgent care on 1/15 tested negative for COVID/flu and was prescribed cough and cold medications without improvement.    hiatal hernia  gerd  pna  LRTI  malaise  HTN  weakness  HLD    AFIB management  emp ABX  vs noted  fio2 titration   goal sat > 88 pct  spcu care  TTE -     PPI  cvs rx regimen  BP control  wean fio2 as tolerated - goal sat > 88 pct  I kayla for atelectasis and hypoxemia  cough rx regimen - sx management in LRTI  biomarkers - cx - sputum cx -   emp ABX for LRTI - PNA - CAP  CT chest reviewed - ggo - bronchiectasis - pna - atelectasis - hx of gastric surgery  nutrition - hydration  monitor VS and HD and Sat  spoke with family on admission  DVT p

## 2025-01-20 LAB
ALBUMIN SERPL ELPH-MCNC: 1.4 G/DL — LOW (ref 3.3–5)
ALP SERPL-CCNC: 141 U/L — HIGH (ref 30–120)
ALT FLD-CCNC: 32 U/L — SIGNIFICANT CHANGE UP (ref 10–60)
ANION GAP SERPL CALC-SCNC: 8 MMOL/L — SIGNIFICANT CHANGE UP (ref 5–17)
AST SERPL-CCNC: 57 U/L — HIGH (ref 10–40)
BILIRUB SERPL-MCNC: 1.1 MG/DL — SIGNIFICANT CHANGE UP (ref 0.2–1.2)
BUN SERPL-MCNC: 20 MG/DL — SIGNIFICANT CHANGE UP (ref 7–23)
CALCIUM SERPL-MCNC: 8.2 MG/DL — LOW (ref 8.4–10.5)
CHLORIDE SERPL-SCNC: 105 MMOL/L — SIGNIFICANT CHANGE UP (ref 96–108)
CO2 SERPL-SCNC: 25 MMOL/L — SIGNIFICANT CHANGE UP (ref 22–31)
CREAT SERPL-MCNC: 0.96 MG/DL — SIGNIFICANT CHANGE UP (ref 0.5–1.3)
EGFR: 79 ML/MIN/1.73M2 — SIGNIFICANT CHANGE UP
GLUCOSE SERPL-MCNC: 109 MG/DL — HIGH (ref 70–99)
HCT VFR BLD CALC: 36.5 % — LOW (ref 39–50)
HGB BLD-MCNC: 12.3 G/DL — LOW (ref 13–17)
LEGIONELLA AG UR QL: NEGATIVE — SIGNIFICANT CHANGE UP
MCHC RBC-ENTMCNC: 29.1 PG — SIGNIFICANT CHANGE UP (ref 27–34)
MCHC RBC-ENTMCNC: 33.7 G/DL — SIGNIFICANT CHANGE UP (ref 32–36)
MCV RBC AUTO: 86.3 FL — SIGNIFICANT CHANGE UP (ref 80–100)
NRBC # BLD: 0 /100 WBCS — SIGNIFICANT CHANGE UP (ref 0–0)
NRBC BLD-RTO: 0 /100 WBCS — SIGNIFICANT CHANGE UP (ref 0–0)
PLATELET # BLD AUTO: 267 K/UL — SIGNIFICANT CHANGE UP (ref 150–400)
POTASSIUM SERPL-MCNC: 3.8 MMOL/L — SIGNIFICANT CHANGE UP (ref 3.5–5.3)
POTASSIUM SERPL-SCNC: 3.8 MMOL/L — SIGNIFICANT CHANGE UP (ref 3.5–5.3)
PROT SERPL-MCNC: 5.5 G/DL — LOW (ref 6–8.3)
RBC # BLD: 4.23 M/UL — SIGNIFICANT CHANGE UP (ref 4.2–5.8)
RBC # FLD: 15.6 % — HIGH (ref 10.3–14.5)
SODIUM SERPL-SCNC: 138 MMOL/L — SIGNIFICANT CHANGE UP (ref 135–145)
WBC # BLD: 18.13 K/UL — HIGH (ref 3.8–10.5)
WBC # FLD AUTO: 18.13 K/UL — HIGH (ref 3.8–10.5)

## 2025-01-20 PROCEDURE — 99233 SBSQ HOSP IP/OBS HIGH 50: CPT

## 2025-01-20 RX ORDER — APIXABAN 5 MG/1
5 TABLET, FILM COATED ORAL
Refills: 0 | Status: DISCONTINUED | OUTPATIENT
Start: 2025-01-20 | End: 2025-01-22

## 2025-01-20 RX ORDER — METOPROLOL SUCCINATE 25 MG
25 TABLET, EXTENDED RELEASE 24 HR ORAL
Refills: 0 | Status: DISCONTINUED | OUTPATIENT
Start: 2025-01-20 | End: 2025-01-22

## 2025-01-20 RX ADMIN — Medication 12.5 MILLIGRAM(S): at 05:07

## 2025-01-20 RX ADMIN — CEFTRIAXONE 100 MILLIGRAM(S): 250 INJECTION, POWDER, FOR SOLUTION INTRAMUSCULAR; INTRAVENOUS at 22:53

## 2025-01-20 RX ADMIN — TAMSULOSIN HYDROCHLORIDE 0.4 MILLIGRAM(S): 0.4 CAPSULE ORAL at 21:40

## 2025-01-20 RX ADMIN — APIXABAN 5 MILLIGRAM(S): 5 TABLET, FILM COATED ORAL at 17:34

## 2025-01-20 RX ADMIN — ATORVASTATIN CALCIUM 20 MILLIGRAM(S): 80 TABLET, FILM COATED ORAL at 21:40

## 2025-01-20 RX ADMIN — IPRATROPIUM BROMIDE AND ALBUTEROL SULFATE 3 MILLILITER(S): .5; 2.5 SOLUTION RESPIRATORY (INHALATION) at 13:25

## 2025-01-20 RX ADMIN — Medication 25 MILLIGRAM(S): at 17:33

## 2025-01-20 RX ADMIN — APIXABAN 2.5 MILLIGRAM(S): 5 TABLET, FILM COATED ORAL at 05:08

## 2025-01-20 NOTE — PROGRESS NOTE ADULT - ASSESSMENT
79M with HLD, esophageal cancer (20+ years ago), PE after ski accident s/p warfarin (40+ years ago) presents on 1/18 with SOB.    #Severe Sepsis secondary to B/L Community Acquired Pneumonia with acute hypoxic respiratory failure: on IV levofloxacin 750mg daily. Wean O2 as tolerated. Appreciate pulmonology input. Trial of duonebs.    #KARIE: due to sepsis. Improved renal function.    #Sinus Tachycardia with intermittent Atrial Fibrillation triggered by respiratory disease: likely due to sepsis. Echocardiogram obtained. Received IV metoprolol. Metoprolol 25mg bid. Appreciate cardiology consult. CHADS2-VASc 3 (had HTN long time ago). On apixaban 5mg bid. He has significant family hx of vascular disease (heart disease in siblings, father stroke in his 50s), no personal hx of established CAD or stents, had stress test last year with Dr. Cuco Cook    Echocardiogram  CONCLUSIONS:   1. Left ventricular cavity is small. Left ventricular wall thickness is mildly increased. Left ventricular systolic function is normal with an ejection fraction visually estimated at 60 to 65 %. There are no regional wall motion abnormalities seen.   2. Normal right ventricular cavity size, with normal wall thickness, and normal right ventricular systolic function.   3. Left atrium is mildly dilated.   4. Tricuspid aortic valve with normal leaflet excursion with normal systolic excursion. There is calcification of the aortic valve leaflets.   5. Mild aortic regurgitation.   6. Trace pulmonic regurgitation.   7. Mitral valve leaflets are diffusely calcified.   8. Mild mitral regurgitation.   9. Mild to moderate tricuspid regurgitation.  10. Estimated pulmonary artery systolic pressure is 41 mmHg, consistent with mild pulmonary hypertension.  11. Small pericardial effusion noted adjacent to the posterior left ventricle.    #chronic issues  -HLD: atorvastatin 20mg nightly  -BPH: tamsulosin 0.4mg daily    Code: FULL  Diet: regular  DVT ppx: apixaban  Dispo: inpatient; can be downgraded from SPCU; likely home in 1-2 days once weaned off O2

## 2025-01-20 NOTE — DIETITIAN INITIAL EVALUATION ADULT - NSICDXPASTMEDICALHX_GEN_ALL_CORE_FT
PAST MEDICAL HISTORY:  Benign colonic polyp     Benign essential hypertension     Other acute pulmonary embolism     Pure hypercholesterolemia

## 2025-01-20 NOTE — DIETITIAN INITIAL EVALUATION ADULT - ADD RECOMMEND
1) Continue cardiac diet, ONS declined - optimize intake through food - double protein portions allowed if pt requests  2) Continue to monitor PO intake, tolerance to diet prescription, weights, labs, GI tolerance, and skin integrity. MD notified via teams regarding any change/changes to diet order.

## 2025-01-20 NOTE — PROGRESS NOTE ADULT - SUBJECTIVE AND OBJECTIVE BOX
ANTOINECALLI DOMINGUEZ is a 79yMale , patient examined and chart reviewed.    INTERVAL HPI/ OVERNIGHT EVENTS:   Afebrile Feeling better.  + cough.    PAST MEDICAL & SURGICAL HISTORY:  Benign colonic polyp  Benign essential hypertension  Pure hypercholesterolemia  Other acute pulmonary embolism    For details regarding the patient's social history, family history, and other miscellaneous elements, please refer the initial infectious diseases consultation and/or the admitting history and physical examination for this admission.    ROS:  CONSTITUTIONAL:  Negative fever or chills  EYES:  Negative  blurry vision or double vision  CARDIOVASCULAR:  Negative for chest pain or palpitations  RESPIRATORY:  Negative for wheezing, or SOB  +cough  GASTROINTESTINAL:  Negative for nausea, vomiting, diarrhea, constipation, or abdominal pain  GENITOURINARY:  Negative frequency, urgency or dysuria  NEUROLOGIC:  No headache, confusion, dizziness, lightheadedness  All other systems were reviewed and are negative     ALLERGIES:  penicillins (Other (Mild to Mod))      Current inpatient medications :    ANTIBIOTICS/RELEVANT:  cefTRIAXone   IVPB      cefTRIAXone   IVPB 2000 milliGRAM(s) IV Intermittent every 24 hours      acetaminophen     Tablet .. 650 milliGRAM(s) Oral every 6 hours PRN  albuterol    90 MICROgram(s) HFA Inhaler 2 Puff(s) Inhalation every 6 hours PRN  albuterol/ipratropium for Nebulization 3 milliLiter(s) Nebulizer every 6 hours PRN  aluminum hydroxide/magnesium hydroxide/simethicone Suspension 30 milliLiter(s) Oral every 4 hours PRN  apixaban 5 milliGRAM(s) Oral two times a day  atorvastatin 20 milliGRAM(s) Oral at bedtime  guaiFENesin Oral Liquid (Sugar-Free) 200 milliGRAM(s) Oral every 6 hours PRN  metoprolol tartrate 25 milliGRAM(s) Oral two times a day  tamsulosin 0.4 milliGRAM(s) Oral at bedtime      Objective:    01-19 @ 07:01 - 01-20 @ 07:00  --------------------------------------------------------  IN: 2590 mL / OUT: 575 mL / NET: 2015 mL    01-20 @ 07:01 - 01-20 @ 15:23  --------------------------------------------------------  IN: 300 mL / OUT: 0 mL / NET: 300 mL      T(C): 36.8 (01-20-25 @ 12:32), Max: 37.4 (01-19-25 @ 15:29)  HR: 86 (01-20-25 @ 12:00) (76 - 88)  BP: 143/84 (01-20-25 @ 12:00) (123/75 - 160/89)  RR: 32 (01-20-25 @ 12:00) (26 - 33)  SpO2: 93% (01-20-25 @ 12:00) (90% - 93%)      Physical Exam:  General: no acute distress  Neck: supple, trachea midline  Lungs: decreased no wheeze/rhonchi  Cardiovascular: regular rate and rhythm, S1 S2  Abdomen: soft, nontender,  bowel sounds normal  Neurological: alert and oriented x3  Skin: no rash  Extremities: no edema        LABS:                         12.3   18.13 )-----------( 267      ( 20 Jan 2025 06:00 )             36.5       01-20    138  |  105  |  20  ----------------------------<  109[H]  3.8   |  25  |  0.96    Ca    8.2[L]      20 Jan 2025 06:00  Mg     1.9     01-19    TPro  5.5[L]  /  Alb  1.4[L]  /  TBili  1.1  /  DBili  x   /  AST  57[H]  /  ALT  32  /  AlkPhos  141[H]  01-20      ABG - ( 18 Jan 2025 20:40 )  pH, Arterial: 7.50  pH, Blood: x     /  pCO2: 29    /  pO2: 84    / HCO3: 23    / Base Excess: -0.6  /  SaO2: 97.7      MICROBIOLOGY:  Streptococcus pneumoniae Ag, Ur (01.18.25 @ 22:35)    Streptococcus pneumoniae Ag, Ur Result: Positive    Culture - Blood (collected 18 Jan 2025 12:10)  Source: .Blood BLOOD  Preliminary Report (19 Jan 2025 17:01):    No growth at 24 hours    Culture - Blood (collected 18 Jan 2025 12:10)  Source: .Blood BLOOD  Preliminary Report (19 Jan 2025 17:01):    No growth at 24 hours      RADIOLOGY & ADDITIONAL STUDIES:  ACC: [id number] EXAM:  CT CHEST   ORDERED BY: DORIAN GRIER     PROCEDURE DATE:  01/18/2025          INTERPRETATION:  CLINICAL INFORMATION: Shortness of breath    COMPARISON: CT chest 11/5/2012    CONTRAST/COMPLICATIONS:  IV Contrast: NONE  Oral Contrast: NONE  .    PROCEDURE:  CT of the Chest was performed.  Sagittal and coronal reformats were performed.    FINDINGS:    LUNGS AND LARGE AIRWAYS: Small mucosal debris within the posterior   tracheal wall. Mild bronchiectasis. Subsegmental consolidation within the   lingula and posterior left upper lobe with additional associated patchy   groundglass airspace disease and air bronchograms. Segmental and   subsegmental consolidations with adjacent groundglass opacities in the   right upper lobe and right middle lobe. Subsegmental consolidation with   air bronchograms in the medial right lower lobe and superior segment   right lower lobe. Bibasilar compressive atelectasis.  PLEURA: Trace bilateral pleural effusions and/or pleural thickening.  VESSELS: Aortic calcifications. Coronary artery calcifications.  HEART: Heart size is normal. No pericardial effusion.  MEDIASTINUM AND JOHANNA: Prior gastric pull-through surgery. Subcentimeter   mediastinal lymph nodes.  CHEST WALL AND LOWER NECK: Within normal limits.  VISUALIZED UPPER ABDOMEN: Within normal limits.  BONES: Degenerative changes.    IMPRESSION:  Multifocal segmental and subsegmental consolidations with air   bronchograms and surrounding patchy groundglass airspace disease most   consistent with multifocal pneumonia with consideration for aspiration   pneumonia    Assessment :   78YO M PMH hyperlipidemia, esophageal CA 20+ years ago, PE after ski accident s/p coumadin (40+ years ago) recent URI symptoms seen at urgent care on 1/15 tested negative for COVID/flu and was prescribed cough and cold medications without improvement who presented to the hospital with c/o worsening SOB cough, fever, lethargy, body aches admitted with Pneumococcal multifocal pneumonia. Afebrile WBC 20K with bandemia Strep pna antigen +  KARIE resolved  WBC better  Blood cultures NGTD    Plan :   Cont Rocephin 2gms IV daily   Trend temps and cbc  Pulm toileting  Asp precautions  Stable from ID standpoint    Advance Directives- Full code  Current Medications are documented.   Drug-drug interactions reviewed.    Continue with present regiment.  Appropriate use of antibiotics and adverse effects reviewed.      I have discussed the above plan of care with patient/ family in detail. They expressed understanding of the  treatment plan . Risks, benefits and alternatives discussed in detail. I have asked if they have any questions or concerns and appropriately addressed them to the best of my ability .    > 35 minutes were spent in direct patient care reviewing notes, medications ,labs data/ imaging , discussion with multidisciplinary team.    Thank you for allowing me to participate in care of your patient .    Marleni Ta MD  Infectious Disease  [phone]      ANTOINECLALI DOMINGUEZ is a 79yMale , patient examined and chart reviewed.    INTERVAL HPI/ OVERNIGHT EVENTS:   Afebrile Feeling better.  + cough.    PAST MEDICAL & SURGICAL HISTORY:  Benign colonic polyp  Benign essential hypertension  Pure hypercholesterolemia  Other acute pulmonary embolism    For details regarding the patient's social history, family history, and other miscellaneous elements, please refer the initial infectious diseases consultation and/or the admitting history and physical examination for this admission.    ROS:  CONSTITUTIONAL:  Negative fever or chills  EYES:  Negative  blurry vision or double vision  CARDIOVASCULAR:  Negative for chest pain or palpitations  RESPIRATORY:  Negative for wheezing, or SOB  +cough  GASTROINTESTINAL:  Negative for nausea, vomiting, diarrhea, constipation, or abdominal pain  GENITOURINARY:  Negative frequency, urgency or dysuria  NEUROLOGIC:  No headache, confusion, dizziness, lightheadedness  All other systems were reviewed and are negative     ALLERGIES:  penicillins (Other (Mild to Mod))      Current inpatient medications :    ANTIBIOTICS/RELEVANT:  cefTRIAXone   IVPB      cefTRIAXone   IVPB 2000 milliGRAM(s) IV Intermittent every 24 hours      acetaminophen     Tablet .. 650 milliGRAM(s) Oral every 6 hours PRN  albuterol    90 MICROgram(s) HFA Inhaler 2 Puff(s) Inhalation every 6 hours PRN  albuterol/ipratropium for Nebulization 3 milliLiter(s) Nebulizer every 6 hours PRN  aluminum hydroxide/magnesium hydroxide/simethicone Suspension 30 milliLiter(s) Oral every 4 hours PRN  apixaban 5 milliGRAM(s) Oral two times a day  atorvastatin 20 milliGRAM(s) Oral at bedtime  guaiFENesin Oral Liquid (Sugar-Free) 200 milliGRAM(s) Oral every 6 hours PRN  metoprolol tartrate 25 milliGRAM(s) Oral two times a day  tamsulosin 0.4 milliGRAM(s) Oral at bedtime      Objective:    01-19 @ 07:01 - 01-20 @ 07:00  --------------------------------------------------------  IN: 2590 mL / OUT: 575 mL / NET: 2015 mL    01-20 @ 07:01 - 01-20 @ 15:23  --------------------------------------------------------  IN: 300 mL / OUT: 0 mL / NET: 300 mL      T(C): 36.8 (01-20-25 @ 12:32), Max: 37.4 (01-19-25 @ 15:29)  HR: 86 (01-20-25 @ 12:00) (76 - 88)  BP: 143/84 (01-20-25 @ 12:00) (123/75 - 160/89)  RR: 32 (01-20-25 @ 12:00) (26 - 33)  SpO2: 93% (01-20-25 @ 12:00) (90% - 93%)      Physical Exam:  General: no acute distress  Neck: supple, trachea midline  Lungs: decreased no wheeze/rhonchi  Cardiovascular: regular rate and rhythm, S1 S2  Abdomen: soft, nontender,  bowel sounds normal  Neurological: alert and oriented x3  Skin: no rash  Extremities: no edema        LABS:                         12.3   18.13 )-----------( 267      ( 20 Jan 2025 06:00 )             36.5       01-20    138  |  105  |  20  ----------------------------<  109[H]  3.8   |  25  |  0.96    Ca    8.2[L]      20 Jan 2025 06:00  Mg     1.9     01-19    TPro  5.5[L]  /  Alb  1.4[L]  /  TBili  1.1  /  DBili  x   /  AST  57[H]  /  ALT  32  /  AlkPhos  141[H]  01-20      ABG - ( 18 Jan 2025 20:40 )  pH, Arterial: 7.50  pH, Blood: x     /  pCO2: 29    /  pO2: 84    / HCO3: 23    / Base Excess: -0.6  /  SaO2: 97.7      MICROBIOLOGY:  Streptococcus pneumoniae Ag, Ur (01.18.25 @ 22:35)    Streptococcus pneumoniae Ag, Ur Result: Positive    Culture - Blood (collected 18 Jan 2025 12:10)  Source: .Blood BLOOD  Preliminary Report (19 Jan 2025 17:01):    No growth at 24 hours    Culture - Blood (collected 18 Jan 2025 12:10)  Source: .Blood BLOOD  Preliminary Report (19 Jan 2025 17:01):    No growth at 24 hours      RADIOLOGY & ADDITIONAL STUDIES:  ACC: [id number] EXAM:  CT CHEST   ORDERED BY: DORIAN GRIER     PROCEDURE DATE:  01/18/2025          INTERPRETATION:  CLINICAL INFORMATION: Shortness of breath    COMPARISON: CT chest 11/5/2012    CONTRAST/COMPLICATIONS:  IV Contrast: NONE  Oral Contrast: NONE  .    PROCEDURE:  CT of the Chest was performed.  Sagittal and coronal reformats were performed.    FINDINGS:    LUNGS AND LARGE AIRWAYS: Small mucosal debris within the posterior   tracheal wall. Mild bronchiectasis. Subsegmental consolidation within the   lingula and posterior left upper lobe with additional associated patchy   groundglass airspace disease and air bronchograms. Segmental and   subsegmental consolidations with adjacent groundglass opacities in the   right upper lobe and right middle lobe. Subsegmental consolidation with   air bronchograms in the medial right lower lobe and superior segment   right lower lobe. Bibasilar compressive atelectasis.  PLEURA: Trace bilateral pleural effusions and/or pleural thickening.  VESSELS: Aortic calcifications. Coronary artery calcifications.  HEART: Heart size is normal. No pericardial effusion.  MEDIASTINUM AND JOHANNA: Prior gastric pull-through surgery. Subcentimeter   mediastinal lymph nodes.  CHEST WALL AND LOWER NECK: Within normal limits.  VISUALIZED UPPER ABDOMEN: Within normal limits.  BONES: Degenerative changes.    IMPRESSION:  Multifocal segmental and subsegmental consolidations with air   bronchograms and surrounding patchy groundglass airspace disease most   consistent with multifocal pneumonia with consideration for aspiration   pneumonia    Assessment :   78YO M PMH hyperlipidemia, esophageal CA 20+ years ago, PE after ski accident s/p coumadin (40+ years ago) recent URI symptoms seen at urgent care on 1/15 tested negative for COVID/flu and was prescribed cough and cold medications without improvement who presented to the hospital with c/o worsening SOB cough, fever, lethargy, body aches admitted with Pneumococcal multifocal pneumonia. Afebrile WBC 20K with bandemia Strep pna antigen +  KARIE resolved  WBC better  Blood cultures NGTD    Plan :   Cont Rocephin 2gms IV daily for pneumococcal pna  Trend temps and cbc  Pulm toileting  Asp precautions  Stable from ID standpoint    Advance Directives- Full code  Current Medications are documented.   Drug-drug interactions reviewed.    Continue with present regiment.  Appropriate use of antibiotics and adverse effects reviewed.      I have discussed the above plan of care with patient/ family in detail. They expressed understanding of the  treatment plan . Risks, benefits and alternatives discussed in detail. I have asked if they have any questions or concerns and appropriately addressed them to the best of my ability .    > 35 minutes were spent in direct patient care reviewing notes, medications ,labs data/ imaging , discussion with multidisciplinary team.    Thank you for allowing me to participate in care of your patient .    Marleni Ta MD  Infectious Disease  [phone]

## 2025-01-20 NOTE — PROGRESS NOTE ADULT - SUBJECTIVE AND OBJECTIVE BOX
Chief Complaint: Palpitations, cough    Interval Events: No events overnight.    Review of Systems:  General: No fevers, chills, weight gain  Skin: No rashes, color changes  Cardiovascular: No chest pain, orthopnea  Respiratory: No shortness of breath, cough  Gastrointestinal: No nausea, abdominal pain  Genitourinary: No incontinence, pain with urination  Musculoskeletal: No pain, swelling, decreased range of motion  Neurological: No headache, weakness  Psychiatric: No depression, anxiety  Endocrine: No weight gain, increased thirst  All other systems are comprehensively negative.    Physical Exam:  Vitals:        Vital Signs Last 24 Hrs  T(C): 36.7 (20 Jan 2025 07:46), Max: 37.4 (19 Jan 2025 15:29)  T(F): 98 (20 Jan 2025 07:46), Max: 99.3 (19 Jan 2025 15:29)  HR: 80 (20 Jan 2025 07:00) (74 - 88)  BP: 148/72 (20 Jan 2025 07:00) (110/64 - 148/72)  BP(mean): 89 (20 Jan 2025 07:00) (78 - 91)  RR: 33 (20 Jan 2025 07:00) (24 - 37)  SpO2: 92% (20 Jan 2025 07:00) (90% - 94%)  Parameters below as of 20 Jan 2025 07:00  Patient On (Oxygen Delivery Method): nasal cannula  O2 Flow (L/min): 4  General: NAD  HEENT: MMM  Neck: No JVD, no carotid bruit  Lungs: CTAB  CV: RRR, nl S1/S2, no M/R/G  Abdomen: S/NT/ND, +BS  Extremities: No LE edema, no cyanosis  Neuro: AAOx3, non-focal  Skin: No rash    Labs:                        12.3   18.13 )-----------( 267      ( 20 Jan 2025 06:00 )             36.5     01-20    138  |  105  |  20  ----------------------------<  109[H]  3.8   |  25  |  0.96    Ca    8.2[L]      20 Jan 2025 06:00  Mg     1.9     01-19    TPro  5.5[L]  /  Alb  1.4[L]  /  TBili  1.1  /  DBili  x   /  AST  57[H]  /  ALT  32  /  AlkPhos  141[H]  01-20        PT/INR - ( 18 Jan 2025 12:10 )   PT: 14.0 sec;   INR: 1.21 ratio         PTT - ( 18 Jan 2025 12:10 )  PTT:29.4 sec    ECG/Telemetry: Sinus rhythm

## 2025-01-20 NOTE — PROGRESS NOTE ADULT - ASSESSMENT
79-year-old male with history of hyperlipidemia bib EMS presents with complaint of cough, fever, lethargy, body aches, shortness of breath with exertion.  He started having fever, lethargy, cough, and body aches was on 1/13.  States that he was seen at urgent care on 1/15 tested negative for COVID/flu and was prescribed cough and cold medications without improvement.    hiatal hernia  gerd  pna  LRTI  malaise  HTN  weakness  HLD    strep pna ag positive - on Rocephin  AFIB management    PPI  [facility] rx regimen  BP control  wean fio2 as tolerated - goal sat > 88 pct  I kayla for atelectasis and hypoxemia  cough rx regimen - sx management in LRTI  biomarkers - cx - sputum cx -   emp ABX for LRTI - PNA - CAP  CT chest reviewed - ggo - bronchiectasis - pna - atelectasis - hx of gastric surgery  nutrition - hydration  monitor VS and HD and Sat  spoke with family on admission  DVT p

## 2025-01-20 NOTE — DIETITIAN INITIAL EVALUATION ADULT - PERTINENT MEDS FT
MEDICATIONS  (STANDING):  apixaban 5 milliGRAM(s) Oral two times a day  atorvastatin 20 milliGRAM(s) Oral at bedtime  cefTRIAXone   IVPB      cefTRIAXone   IVPB 2000 milliGRAM(s) IV Intermittent every 24 hours  metoprolol tartrate 25 milliGRAM(s) Oral two times a day  tamsulosin 0.4 milliGRAM(s) Oral at bedtime    MEDICATIONS  (PRN):  acetaminophen     Tablet .. 650 milliGRAM(s) Oral every 6 hours PRN Temp greater or equal to 38C (100.4F), Mild Pain (1 - 3)  albuterol    90 MICROgram(s) HFA Inhaler 2 Puff(s) Inhalation every 6 hours PRN Shortness of Breath and/or Wheezing  albuterol/ipratropium for Nebulization 3 milliLiter(s) Nebulizer every 6 hours PRN Shortness of Breath and/or Wheezing  aluminum hydroxide/magnesium hydroxide/simethicone Suspension 30 milliLiter(s) Oral every 4 hours PRN Dyspepsia  guaiFENesin Oral Liquid (Sugar-Free) 200 milliGRAM(s) Oral every 6 hours PRN Cough

## 2025-01-20 NOTE — PROGRESS NOTE ADULT - SUBJECTIVE AND OBJECTIVE BOX
Date/Time Patient Seen:  		  Referring MD:   Data Reviewed	       Patient is a 79y old  Male who presents with a chief complaint of shortness of breath (19 Jan 2025 13:07)      Subjective/HPI     PAST MEDICAL & SURGICAL HISTORY:  Benign colonic polyp    Benign essential hypertension    Pure hypercholesterolemia    Other acute pulmonary embolism    No Past Surgical History          Medication list         MEDICATIONS  (STANDING):  apixaban 2.5 milliGRAM(s) Oral every 12 hours  atorvastatin 20 milliGRAM(s) Oral at bedtime  cefTRIAXone   IVPB      cefTRIAXone   IVPB 2000 milliGRAM(s) IV Intermittent every 24 hours  lactated ringers. 1000 milliLiter(s) (100 mL/Hr) IV Continuous <Continuous>  metoprolol tartrate 12.5 milliGRAM(s) Oral two times a day  tamsulosin 0.4 milliGRAM(s) Oral at bedtime    MEDICATIONS  (PRN):  acetaminophen     Tablet .. 650 milliGRAM(s) Oral every 6 hours PRN Temp greater or equal to 38C (100.4F), Mild Pain (1 - 3)  albuterol    90 MICROgram(s) HFA Inhaler 2 Puff(s) Inhalation every 6 hours PRN Shortness of Breath and/or Wheezing  albuterol/ipratropium for Nebulization 3 milliLiter(s) Nebulizer every 6 hours PRN Shortness of Breath and/or Wheezing  aluminum hydroxide/magnesium hydroxide/simethicone Suspension 30 milliLiter(s) Oral every 4 hours PRN Dyspepsia  guaiFENesin Oral Liquid (Sugar-Free) 200 milliGRAM(s) Oral every 6 hours PRN Cough         Vitals log        ICU Vital Signs Last 24 Hrs  T(C): 36.6 (20 Jan 2025 00:12), Max: 37.4 (19 Jan 2025 15:29)  T(F): 97.8 (20 Jan 2025 00:12), Max: 99.3 (19 Jan 2025 15:29)  HR: 76 (20 Jan 2025 04:00) (66 - 88)  BP: 123/75 (20 Jan 2025 04:00) (96/60 - 126/74)  BP(mean): 90 (20 Jan 2025 04:00) (72 - 91)  ABP: --  ABP(mean): --  RR: 28 (20 Jan 2025 04:00) (24 - 37)  SpO2: 91% (20 Jan 2025 04:00) (90% - 96%)    O2 Parameters below as of 19 Jan 2025 12:00  Patient On (Oxygen Delivery Method): nasal cannula  O2 Flow (L/min): 4               Input and Output:  I&O's Detail    18 Jan 2025 07:01  -  19 Jan 2025 07:00  --------------------------------------------------------  IN:    Diltiazem: 170 mL    Lactated Ringers: 1200 mL    Lactated Ringers Bolus: 500 mL    Oral Fluid: 420 mL  Total IN: 2290 mL    OUT:    Voided (mL): 850 mL  Total OUT: 850 mL    Total NET: 1440 mL      19 Jan 2025 07:01  -  20 Jan 2025 05:54  --------------------------------------------------------  IN:    IV PiggyBack: 50 mL    Lactated Ringers: 2100 mL    Oral Fluid: 240 mL  Total IN: 2390 mL    OUT:    Diltiazem: 0 mL    Voided (mL): 575 mL  Total OUT: 575 mL    Total NET: 1815 mL          Lab Data                        12.4   20.03 )-----------( 246      ( 19 Jan 2025 06:00 )             37.3     01-19    140  |  104  |  23  ----------------------------<  107[H]  3.4[L]   |  26  |  1.04    Ca    8.3[L]      19 Jan 2025 06:00  Mg     1.9     01-19    TPro  4.8[L]  /  Alb  1.6[L]  /  TBili  1.4[H]  /  DBili  x   /  AST  38  /  ALT  22  /  AlkPhos  104  01-19    ABG - ( 18 Jan 2025 20:40 )  pH, Arterial: 7.50  pH, Blood: x     /  pCO2: 29    /  pO2: 84    / HCO3: 23    / Base Excess: -0.6  /  SaO2: 97.7                    Review of Systems	      Objective     Physical Examination    heart s1s2  lung dec BS  head nc  head at  abd soft      Pertinent Lab findings & Imaging      Brannon:  NO   Adequate UO     I&O's Detail    18 Jan 2025 07:01  -  19 Jan 2025 07:00  --------------------------------------------------------  IN:    Diltiazem: 170 mL    Lactated Ringers: 1200 mL    Lactated Ringers Bolus: 500 mL    Oral Fluid: 420 mL  Total IN: 2290 mL    OUT:    Voided (mL): 850 mL  Total OUT: 850 mL    Total NET: 1440 mL      19 Jan 2025 07:01  -  20 Jan 2025 05:54  --------------------------------------------------------  IN:    IV PiggyBack: 50 mL    Lactated Ringers: 2100 mL    Oral Fluid: 240 mL  Total IN: 2390 mL    OUT:    Diltiazem: 0 mL    Voided (mL): 575 mL  Total OUT: 575 mL    Total NET: 1815 mL               Discussed with:     Cultures:	        Radiology

## 2025-01-20 NOTE — DIETITIAN INITIAL EVALUATION ADULT - REASON FOR ADMISSION
Pneumonia due to infectious organism    Per HPI:  79-year-old male with history of hyperlipidemia, esophageal CA 20+ years ago, PE after ski accident s/p coumadin (40+ years ago)  bib EMS presents with complaint of palpitations with exertion (excessive for him when going up the stairs), cough, fever, lethargy, body aches, shortness of breath with exertion.  He started having fever, lethargy, cough, and body aches was on .  States that he was seen at urgent care on 1/15 tested negative for COVID/flu and was prescribed cough and cold medications without improvement.  Started having shortness of breath with exertion 2 days ago and still has mild cough, fever subsided 3 days ago.  He was seen at urgent care today and sent to ER for evaluation.  As per EMS, patient hypoxic at 89-90%RA. Denies recent travel, known sick contacts, abdominal pain, vomiting, diarrhea, chest pain, calf pain/swelling or other symptoms.  No PCP. Sees Dr. Cuco Cook for Cardiology due to family hx of heart disease  (father  of stroke in his 50s, multiple siblings with heart disease).     (2025 14:05) None known

## 2025-01-20 NOTE — DIETITIAN INITIAL EVALUATION ADULT - OTHER INFO
Visited patient in room, pt able to answer questions. Admitted for sepsis 2/2 community acquired strep pneumonia. presents with variable/good appetite/po intake, consuming % of cardiac meals, has slightly improved since appetite just PTA. Denies n/v/d/c, last BM  1/20 . No reported difficulty chewing or swallowing. NKFA. Current adm weight 74.8kg, weight appears to be stable, will continue to monitor weight trends as able.    Pertinent labs/meds reviewed: /89    Education provided at this time on cardiac diet in house. Explained diet will be modified with less salt and fat, DASH seasoning packets. Pt understood and had no nutrition questions at this time. RD to remain available per protocol.

## 2025-01-20 NOTE — DIETITIAN INITIAL EVALUATION ADULT - PERTINENT LABORATORY DATA
01-20    138  |  105  |  20  ----------------------------<  109[H]  3.8   |  25  |  0.96    Ca    8.2[L]      20 Jan 2025 06:00  Mg     1.9     01-19    TPro  5.5[L]  /  Alb  1.4[L]  /  TBili  1.1  /  DBili  x   /  AST  57[H]  /  ALT  32  /  AlkPhos  141[H]  01-20

## 2025-01-20 NOTE — DIETITIAN INITIAL EVALUATION ADULT - ORAL INTAKE PTA/DIET HISTORY
Wts per HIE:  68.5kg April 2024  69.9kg Aug 2024 Per pt PTA states having 1 cup of coffee, a sandwich for lunch, and homecooked dinner. Uses minimal salt in diet. Had good appetite PTA until about 1 week PTA when symptoms leading to admission progressed. Pt reports no wt change, no supplement/vitamins PTA, no food allergies/restrictions/intolerances.    Wts per HIE:  68.5kg April 2024  69.9kg Aug 2024

## 2025-01-20 NOTE — PROGRESS NOTE ADULT - ASSESSMENT
The patient is an 79 year old male with a history of HTN, HL, PE, esophageal cancer s/p surgery who presents with cough, fevers, palpitations in the setting of PNA.    Plan:  - ECG with atrial fibrillation  - Currently in sinus rhythm on telemetry  - Echo with normal LV systolic function, mild pulm HTN, small pericardial effusion  -   - Cardiac enzymes negative  - CT chest with multifocal PNA  - Strep pneumo Ag positive  - Increase to apixaban 5 mg bid  - Change to metoprolol tartrate 25 mg bid

## 2025-01-21 PROCEDURE — 99232 SBSQ HOSP IP/OBS MODERATE 35: CPT

## 2025-01-21 RX ADMIN — Medication 25 MILLIGRAM(S): at 05:23

## 2025-01-21 RX ADMIN — TAMSULOSIN HYDROCHLORIDE 0.4 MILLIGRAM(S): 0.4 CAPSULE ORAL at 21:46

## 2025-01-21 RX ADMIN — Medication 25 MILLIGRAM(S): at 17:39

## 2025-01-21 RX ADMIN — APIXABAN 5 MILLIGRAM(S): 5 TABLET, FILM COATED ORAL at 17:37

## 2025-01-21 RX ADMIN — APIXABAN 5 MILLIGRAM(S): 5 TABLET, FILM COATED ORAL at 05:23

## 2025-01-21 RX ADMIN — CEFTRIAXONE 100 MILLIGRAM(S): 250 INJECTION, POWDER, FOR SOLUTION INTRAMUSCULAR; INTRAVENOUS at 21:46

## 2025-01-21 NOTE — PROGRESS NOTE ADULT - SUBJECTIVE AND OBJECTIVE BOX
ANTOINECALLI DOMINGUEZ is a 79yMale , patient examined and chart reviewed.    INTERVAL HPI/ OVERNIGHT EVENTS:   Afebrile Feeling better.  + cough. No events.  Wife at bedside.    PAST MEDICAL & SURGICAL HISTORY:  Benign colonic polyp  Benign essential hypertension  Pure hypercholesterolemia  Other acute pulmonary embolism    For details regarding the patient's social history, family history, and other miscellaneous elements, please refer the initial infectious diseases consultation and/or the admitting history and physical examination for this admission.    ROS:  CONSTITUTIONAL:  Negative fever or chills  EYES:  Negative  blurry vision or double vision  CARDIOVASCULAR:  Negative for chest pain or palpitations  RESPIRATORY:  Negative for wheezing, or SOB  +cough  GASTROINTESTINAL:  Negative for nausea, vomiting, diarrhea, constipation, or abdominal pain  GENITOURINARY:  Negative frequency, urgency or dysuria  NEUROLOGIC:  No headache, confusion, dizziness, lightheadedness  All other systems were reviewed and are negative     ALLERGIES:  penicillins (Other (Mild to Mod))      Current inpatient medications :    ANTIBIOTICS/RELEVANT:  cefTRIAXone   IVPB      cefTRIAXone   IVPB 2000 milliGRAM(s) IV Intermittent every 24 hours    MEDICATIONS  (STANDING):  apixaban 5 milliGRAM(s) Oral two times a day  metoprolol tartrate 25 milliGRAM(s) Oral two times a day  tamsulosin 0.4 milliGRAM(s) Oral at bedtime    MEDICATIONS  (PRN):  acetaminophen     Tablet .. 650 milliGRAM(s) Oral every 6 hours PRN Temp greater or equal to 38C (100.4F), Mild Pain (1 - 3)  albuterol    90 MICROgram(s) HFA Inhaler 2 Puff(s) Inhalation every 6 hours PRN Shortness of Breath and/or Wheezing  albuterol/ipratropium for Nebulization 3 milliLiter(s) Nebulizer every 6 hours PRN Shortness of Breath and/or Wheezing  aluminum hydroxide/magnesium hydroxide/simethicone Suspension 30 milliLiter(s) Oral every 4 hours PRN Dyspepsia  guaiFENesin Oral Liquid (Sugar-Free) 200 milliGRAM(s) Oral every 6 hours PRN Cough    Objective:  Vital Signs Last 24 Hrs  T(C): 37.5 (21 Jan 2025 16:04), Max: 37.5 (21 Jan 2025 16:04)  T(F): 99.5 (21 Jan 2025 16:04), Max: 99.5 (21 Jan 2025 16:04)  HR: 93 (21 Jan 2025 18:43) (78 - 97)  BP: 126/74 (21 Jan 2025 17:38) (119/80 - 145/95)  BP(mean): 90 (21 Jan 2025 17:38) (90 - 110)  RR: 27 (21 Jan 2025 16:04) (27 - 36)  SpO2: 93% (21 Jan 2025 16:04) (93% - 97%)    Parameters below as of 21 Jan 2025 18:43  Patient On (Oxygen Delivery Method): nasal cannula  O2 Flow (L/min): 1.5      Physical Exam:  General: no acute distress  Neck: supple, trachea midline  Lungs: decreased no wheeze/rhonchi  Cardiovascular: regular rate and rhythm, S1 S2  Abdomen: soft, nontender,  bowel sounds normal  Neurological: alert and oriented x3  Skin: no rash  Extremities: no edema        LABS:                         12.3   18.13 )-----------( 267      ( 20 Jan 2025 06:00 )             36.5       01-20    138  |  105  |  20  ----------------------------<  109[H]  3.8   |  25  |  0.96    Ca    8.2[L]      20 Jan 2025 06:00  Mg     1.9     01-19    TPro  5.5[L]  /  Alb  1.4[L]  /  TBili  1.1  /  DBili  x   /  AST  57[H]  /  ALT  32  /  AlkPhos  141[H]  01-20      ABG - ( 18 Jan 2025 20:40 )  pH, Arterial: 7.50  pH, Blood: x     /  pCO2: 29    /  pO2: 84    / HCO3: 23    / Base Excess: -0.6  /  SaO2: 97.7      MICROBIOLOGY:  Streptococcus pneumoniae Ag, Ur (01.18.25 @ 22:35)    Streptococcus pneumoniae Ag, Ur Result: Positive    Culture - Blood (collected 18 Jan 2025 12:10)  Source: .Blood BLOOD  Preliminary Report (19 Jan 2025 17:01):    No growth at 24 hours    Culture - Blood (collected 18 Jan 2025 12:10)  Source: .Blood BLOOD  Preliminary Report (19 Jan 2025 17:01):    No growth at 24 hours      RADIOLOGY & ADDITIONAL STUDIES:  ACC: [id number] EXAM:  CT CHEST   ORDERED BY: DORIAN GRIER     PROCEDURE DATE:  01/18/2025          INTERPRETATION:  CLINICAL INFORMATION: Shortness of breath    COMPARISON: CT chest 11/5/2012    CONTRAST/COMPLICATIONS:  IV Contrast: NONE  Oral Contrast: NONE  .    PROCEDURE:  CT of the Chest was performed.  Sagittal and coronal reformats were performed.    FINDINGS:    LUNGS AND LARGE AIRWAYS: Small mucosal debris within the posterior   tracheal wall. Mild bronchiectasis. Subsegmental consolidation within the   lingula and posterior left upper lobe with additional associated patchy   groundglass airspace disease and air bronchograms. Segmental and   subsegmental consolidations with adjacent groundglass opacities in the   right upper lobe and right middle lobe. Subsegmental consolidation with   air bronchograms in the medial right lower lobe and superior segment   right lower lobe. Bibasilar compressive atelectasis.  PLEURA: Trace bilateral pleural effusions and/or pleural thickening.  VESSELS: Aortic calcifications. Coronary artery calcifications.  HEART: Heart size is normal. No pericardial effusion.  MEDIASTINUM AND JOHANNA: Prior gastric pull-through surgery. Subcentimeter   mediastinal lymph nodes.  CHEST WALL AND LOWER NECK: Within normal limits.  VISUALIZED UPPER ABDOMEN: Within normal limits.  BONES: Degenerative changes.    IMPRESSION:  Multifocal segmental and subsegmental consolidations with air   bronchograms and surrounding patchy groundglass airspace disease most   consistent with multifocal pneumonia with consideration for aspiration   pneumonia    Assessment :   80YO M PMH hyperlipidemia, esophageal CA 20+ years ago, PE after ski accident s/p coumadin (40+ years ago) recent URI symptoms seen at urgent care on 1/15 tested negative for COVID/flu and was prescribed cough and cold medications without improvement who presented to the hospital with c/o worsening SOB cough, fever, lethargy, body aches admitted with Pneumococcal multifocal pneumonia. Afebrile WBC 20K with bandemia Strep pna antigen +  KARIE resolved  WBC better  Blood cultures NGTD  Clinically improving    Plan :   Rpt labs tmrw  Cont Rocephin 2gms IV daily   Trend temps and cbc  Pulm toileting  Asp precautions  Stable from ID standpoint    Advance Directives- Full code  Current Medications are documented.   Drug-drug interactions reviewed.    Continue with present regiment.  Appropriate use of antibiotics and adverse effects reviewed.      I have discussed the above plan of care with patient/ family in detail. They expressed understanding of the  treatment plan . Risks, benefits and alternatives discussed in detail. I have asked if they have any questions or concerns and appropriately addressed them to the best of my ability .    > 35 minutes were spent in direct patient care reviewing notes, medications ,labs data/ imaging , discussion with multidisciplinary team.    Thank you for allowing me to participate in care of your patient .    Marleni Ta MD  Infectious Disease  [phone]

## 2025-01-21 NOTE — PROGRESS NOTE ADULT - ASSESSMENT
79M with HLD, esophageal cancer (20+ years ago), PE after ski accident s/p warfarin (40+ years ago) presents on 1/18 with SOB.    Severe Sepsis secondary to B/L Community Acquired Pneumonia with acute hypoxic respiratory failure  on IV levofloxacin 750mg daily  Wean O2 as tolerated  Appreciate pulmonology input  Trial of javed.    KARIE Improving   Due to sepsis    Sinus Tachycardia with intermittent Atrial Fibrillation triggered by respiratory disease  likely due to sepsis. Echocardiogram obtained  Continue Metoprolol 25mg bid.  Cardiology following  CHADS2-VASc 3 (had HTN long time ago)  On apixaban 5mg bid  Continue on tele     Anemia  Continue to monitor CBC     Transaminitis   Hold Lipitor     Chronic issues  HLD: Hold atorvastatin 20mg nightly  BPH: tamsulosin 0.4mg daily    Code: FULL  Diet: regular  DVT ppx: apixaban  Dispo: inpatient; can be downgraded from SPCU; likely home in 1-2 days once weaned off O2   79M with HLD, esophageal cancer (20+ years ago), PE after ski accident s/p warfarin (40+ years ago) presents on 1/18 with SOB.    Severe Sepsis secondary to B/L Community Acquired Pneumonia with acute hypoxic respiratory failure  S/p levofloxacin  Continue with ceftriaxone as per ID   Wean O2 as tolerated  Appreciate pulmonology input  Trial of javed.    KARIE Improving   Due to sepsis    Sinus Tachycardia with intermittent Atrial Fibrillation triggered by respiratory disease  likely due to sepsis. Echocardiogram obtained  Continue Metoprolol 25mg bid.  Cardiology following  CHADS2-VASc 3 (had HTN long time ago)  On apixaban 5mg bid  Continue on tele     Anemia  Continue to monitor CBC     Transaminitis   Hold Lipitor     Chronic issues  HLD: Hold atorvastatin 20mg nightly  BPH: tamsulosin 0.4mg daily    Code: FULL  Diet: regular  DVT ppx: apixaban  Dispo: inpatient; can be downgraded from SPCU; likely home in 1-2 days once weaned off O2

## 2025-01-21 NOTE — PROGRESS NOTE ADULT - SUBJECTIVE AND OBJECTIVE BOX
Chief Complaint: Palpitations, cough    Interval Events: No events overnight.    Review of Systems:  General: No fevers, chills, weight gain  Skin: No rashes, color changes  Cardiovascular: No chest pain, orthopnea  Respiratory: No shortness of breath, cough  Gastrointestinal: No nausea, abdominal pain  Genitourinary: No incontinence, pain with urination  Musculoskeletal: No pain, swelling, decreased range of motion  Neurological: No headache, weakness  Psychiatric: No depression, anxiety  Endocrine: No weight gain, increased thirst  All other systems are comprehensively negative.    Physical Exam:  Vital Signs Last 24 Hrs  T(C): 36.4 (21 Jan 2025 07:23), Max: 36.9 (20 Jan 2025 20:24)  T(F): 97.5 (21 Jan 2025 07:23), Max: 98.5 (20 Jan 2025 20:24)  HR: 78 (21 Jan 2025 08:00) (78 - 99)  BP: 145/95 (21 Jan 2025 08:00) (119/80 - 148/78)  BP(mean): 110 (21 Jan 2025 08:00) (90 - 110)  RR: 34 (21 Jan 2025 08:00) (26 - 36)  SpO2: 93% (21 Jan 2025 08:00) (90% - 94%)  Parameters below as of 21 Jan 2025 08:00  Patient On (Oxygen Delivery Method): nasal cannula  General: NAD  HEENT: MMM  Neck: No JVD, no carotid bruit  Lungs: CTAB  CV: RRR, nl S1/S2, no M/R/G  Abdomen: S/NT/ND, +BS  Extremities: No LE edema, no cyanosis  Neuro: AAOx3, non-focal  Skin: No rash    Labs:             01-20    138  |  105  |  20  ----------------------------<  109[H]  3.8   |  25  |  0.96    Ca    8.2[L]      20 Jan 2025 06:00    TPro  5.5[L]  /  Alb  1.4[L]  /  TBili  1.1  /  DBili  x   /  AST  57[H]  /  ALT  32  /  AlkPhos  141[H]  01-20                        12.3   18.13 )-----------( 267      ( 20 Jan 2025 06:00 )             36.5       ECG/Telemetry: Sinus rhythm

## 2025-01-21 NOTE — PROGRESS NOTE ADULT - ASSESSMENT
The patient is an 79 year old male with a history of HTN, HL, PE, esophageal cancer s/p surgery who presents with cough, fevers, palpitations in the setting of PNA.    Plan:  - ECG with atrial fibrillation  - Currently in sinus rhythm on telemetry  - Echo with normal LV systolic function, mild pulm HTN, small pericardial effusion  -   - Cardiac enzymes negative  - CT chest with multifocal PNA  - Strep pneumo Ag positive  - Continue apixaban 5 mg bid  - Continue metoprolol tartrate 25 mg bid

## 2025-01-21 NOTE — CASE MANAGEMENT PROGRESS NOTE - NSCMPROGRESSNOTE_GEN_ALL_CORE
Update Communication Note: As per morning rounds,  79 year old male with a history of HTN, HL, PE, esophageal cancer s/p surgery who presents with cough, fevers, palpitations in the setting of PNA. Patient receiving IV Rocephin for Strep PNA and on nasal cannula 4 liters of oxygen. Patient doesn't wear oxygen at home. CM will set patient up with referral and continue to follow case.      Update Communication Note: As per morning rounds,  79 year old male with a history of HTN, HL, PE, esophageal cancer s/p surgery who presents with cough, fevers, palpitations in the setting of PNA. Patient receiving IV Rocephin for Strep PNA and on nasal cannula 1 liters of oxygen. Patient doesn't wear oxygen at home. CM will set patient up with referral and continue to follow case.

## 2025-01-21 NOTE — PROGRESS NOTE ADULT - SUBJECTIVE AND OBJECTIVE BOX
Date/Time Patient Seen:  		  Referring MD:   Data Reviewed	       Patient is a 79y old  Male who presents with a chief complaint of shortness of breath (20 Jan 2025 15:22)      Subjective/HPI     PAST MEDICAL & SURGICAL HISTORY:  Benign colonic polyp    Benign essential hypertension    Pure hypercholesterolemia    Other acute pulmonary embolism    No Past Surgical History          Medication list         MEDICATIONS  (STANDING):  apixaban 5 milliGRAM(s) Oral two times a day  atorvastatin 20 milliGRAM(s) Oral at bedtime  cefTRIAXone   IVPB      cefTRIAXone   IVPB 2000 milliGRAM(s) IV Intermittent every 24 hours  metoprolol tartrate 25 milliGRAM(s) Oral two times a day  tamsulosin 0.4 milliGRAM(s) Oral at bedtime    MEDICATIONS  (PRN):  acetaminophen     Tablet .. 650 milliGRAM(s) Oral every 6 hours PRN Temp greater or equal to 38C (100.4F), Mild Pain (1 - 3)  albuterol    90 MICROgram(s) HFA Inhaler 2 Puff(s) Inhalation every 6 hours PRN Shortness of Breath and/or Wheezing  albuterol/ipratropium for Nebulization 3 milliLiter(s) Nebulizer every 6 hours PRN Shortness of Breath and/or Wheezing  aluminum hydroxide/magnesium hydroxide/simethicone Suspension 30 milliLiter(s) Oral every 4 hours PRN Dyspepsia  guaiFENesin Oral Liquid (Sugar-Free) 200 milliGRAM(s) Oral every 6 hours PRN Cough         Vitals log        ICU Vital Signs Last 24 Hrs  T(C): 36.7 (20 Jan 2025 23:49), Max: 36.9 (20 Jan 2025 20:24)  T(F): 98 (20 Jan 2025 23:49), Max: 98.5 (20 Jan 2025 20:24)  HR: 86 (21 Jan 2025 02:00) (80 - 99)  BP: 126/73 (21 Jan 2025 02:00) (119/80 - 160/89)  BP(mean): 91 (21 Jan 2025 02:00) (89 - 108)  ABP: --  ABP(mean): --  RR: 34 (21 Jan 2025 02:00) (26 - 36)  SpO2: 94% (21 Jan 2025 02:00) (90% - 94%)    O2 Parameters below as of 21 Jan 2025 02:00  Patient On (Oxygen Delivery Method): nasal cannula  O2 Flow (L/min): 4               Input and Output:  I&O's Detail    19 Jan 2025 07:01  -  20 Jan 2025 07:00  --------------------------------------------------------  IN:    IV PiggyBack: 50 mL    Lactated Ringers: 2300 mL    Oral Fluid: 240 mL  Total IN: 2590 mL    OUT:    Diltiazem: 0 mL    Voided (mL): 575 mL  Total OUT: 575 mL    Total NET: 2015 mL      20 Jan 2025 07:01  -  21 Jan 2025 05:22  --------------------------------------------------------  IN:    IV PiggyBack: 50 mL    Lactated Ringers: 300 mL    Oral Fluid: 220 mL  Total IN: 570 mL    OUT:    Voided (mL): 725 mL  Total OUT: 725 mL    Total NET: -155 mL          Lab Data                        12.3   18.13 )-----------( 267      ( 20 Jan 2025 06:00 )             36.5     01-20    138  |  105  |  20  ----------------------------<  109[H]  3.8   |  25  |  0.96    Ca    8.2[L]      20 Jan 2025 06:00  Mg     1.9     01-19    TPro  5.5[L]  /  Alb  1.4[L]  /  TBili  1.1  /  DBili  x   /  AST  57[H]  /  ALT  32  /  AlkPhos  141[H]  01-20            Review of Systems	      Objective     Physical Examination    heart s1s2  lung dec BS  head nc  head at      Pertinent Lab findings & Imaging      Brannon:  NO   Adequate UO     I&O's Detail    19 Jan 2025 07:01  -  20 Jan 2025 07:00  --------------------------------------------------------  IN:    IV PiggyBack: 50 mL    Lactated Ringers: 2300 mL    Oral Fluid: 240 mL  Total IN: 2590 mL    OUT:    Diltiazem: 0 mL    Voided (mL): 575 mL  Total OUT: 575 mL    Total NET: 2015 mL      20 Jan 2025 07:01  -  21 Jan 2025 05:22  --------------------------------------------------------  IN:    IV PiggyBack: 50 mL    Lactated Ringers: 300 mL    Oral Fluid: 220 mL  Total IN: 570 mL    OUT:    Voided (mL): 725 mL  Total OUT: 725 mL    Total NET: -155 mL               Discussed with:     Cultures:	        Radiology

## 2025-01-21 NOTE — PROGRESS NOTE ADULT - SUBJECTIVE AND OBJECTIVE BOX
Patient is a 79y old  Male who presents with a chief complaint of shortness of breath (21 Jan 2025 05:22)      INTERVAL HPI/OVERNIGHT EVENTS:  Overnight nothing significant happened.   Pt was seen today at bedside, no acute complaints. Breathing is better. Denies fever, chills, abdominal pain, cough , sob, chest pain.     MEDICATIONS  (STANDING):  apixaban 5 milliGRAM(s) Oral two times a day  atorvastatin 20 milliGRAM(s) Oral at bedtime  cefTRIAXone   IVPB      cefTRIAXone   IVPB 2000 milliGRAM(s) IV Intermittent every 24 hours  metoprolol tartrate 25 milliGRAM(s) Oral two times a day  tamsulosin 0.4 milliGRAM(s) Oral at bedtime    MEDICATIONS  (PRN):  acetaminophen     Tablet .. 650 milliGRAM(s) Oral every 6 hours PRN Temp greater or equal to 38C (100.4F), Mild Pain (1 - 3)  albuterol    90 MICROgram(s) HFA Inhaler 2 Puff(s) Inhalation every 6 hours PRN Shortness of Breath and/or Wheezing  albuterol/ipratropium for Nebulization 3 milliLiter(s) Nebulizer every 6 hours PRN Shortness of Breath and/or Wheezing  aluminum hydroxide/magnesium hydroxide/simethicone Suspension 30 milliLiter(s) Oral every 4 hours PRN Dyspepsia  guaiFENesin Oral Liquid (Sugar-Free) 200 milliGRAM(s) Oral every 6 hours PRN Cough      Allergies    penicillins (Other (Mild to Mod))    Intolerances        REVIEW OF SYSTEMS:  CONSTITUTIONAL: No fever, weight loss, or fatigue  EYES: No eye pain, visual disturbances, or discharge  ENMT:  No difficulty hearing, tinnitus, vertigo; No sinus or throat pain  NECK: No pain or stiffness  RESPIRATORY: No cough, wheezing, chills or hemoptysis; No shortness of breath  CARDIOVASCULAR: No chest pain, palpitations, lightheadedness, or leg swelling  GASTROINTESTINAL: No abdominal or epigastric pain. No nausea, vomiting, or hematemesis; No diarrhea or constipation. No melena or hematochezia.  GENITOURINARY: No dysuria, frequency, hematuria, or incontinence  NEUROLOGICAL: No headaches, memory loss, vertigo, loss of strength, numbness, or tremors  SKIN: No itching, burning, rashes, or lesions   MUSCULOSKELETAL: No joint pain or swelling; No muscle, back, or extremity pain  PSYCHIATRIC: No depression, anxiety, or mood swings        Vital Signs Last 24 Hrs  T(C): 37.5 (21 Jan 2025 16:04), Max: 37.5 (21 Jan 2025 16:04)  T(F): 99.5 (21 Jan 2025 16:04), Max: 99.5 (21 Jan 2025 16:04)  HR: 89 (21 Jan 2025 16:04) (78 - 99)  BP: 134/90 (21 Jan 2025 16:04) (119/80 - 145/95)  BP(mean): 101 (21 Jan 2025 16:04) (90 - 110)  RR: 27 (21 Jan 2025 16:04) (26 - 36)  SpO2: 93% (21 Jan 2025 16:04) (90% - 97%)    Parameters below as of 21 Jan 2025 16:04  Patient On (Oxygen Delivery Method): nasal cannula  O2 Flow (L/min): 2      PHYSICAL EXAM:  GENERAL: NAD, well-groomed, well-developed  HEAD:  Atraumatic, Normocephalic  EYES: EOMI, PERRLA, conjunctiva and sclera clear  ENMT: Moist mucous membranes,   NECK: Supple, No JVD, Normal thyroid  NERVOUS SYSTEM:  Alert & Oriented X 3  CHEST/LUNG: decreased air entry and b/l crackles  HEART: Regular rate and rhythm; No murmurs, rubs, or gallops  ABDOMEN: Soft, Nontender, Nondistended; Bowel sounds present  EXTREMITIES:  2+ Peripheral Pulses, No clubbing, cyanosis, or edema  SKIN: No rashes or lesions    LABS:      Ca    8.2        20 Jan 2025 06:00        Urinalysis Basic - ( 20 Jan 2025 06:00 )    Color: x / Appearance: x / SG: x / pH: x  Gluc: 109 mg/dL / Ketone: x  / Bili: x / Urobili: x   Blood: x / Protein: x / Nitrite: x   Leuk Esterase: x / RBC: x / WBC x   Sq Epi: x / Non Sq Epi: x / Bacteria: x

## 2025-01-21 NOTE — PROGRESS NOTE ADULT - ASSESSMENT
79-year-old male with history of hyperlipidemia bib [facility] presents with complaint of cough, fever, lethargy, body aches, shortness of breath with exertion.  He started having fever, lethargy, cough, and body aches was on 1/13.  States that he was seen at urgent care on 1/15 tested negative for COVID/flu and was prescribed cough and cold medications without improvement.    hiatal hernia  gerd  pna  LRTI  malaise  HTN  weakness  HLD    strep pna ag positive - on Rocephin  AFIB management  vs noted  fio2 wean    PPI  cvs rx regimen  BP control  wean fio2 as tolerated - goal sat > 88 pct  I kayla for atelectasis and hypoxemia  cough rx regimen - sx management in LRTI  biomarkers - cx - sputum cx -   emp ABX for LRTI - PNA - CAP  CT chest reviewed - ggo - bronchiectasis - pna - atelectasis - hx of gastric surgery  nutrition - hydration  monitor VS and HD and Sat  spoke with family on admission  DVT p

## 2025-01-22 ENCOUNTER — TRANSCRIPTION ENCOUNTER (OUTPATIENT)
Age: 80
End: 2025-01-22

## 2025-01-22 VITALS
HEART RATE: 79 BPM | DIASTOLIC BLOOD PRESSURE: 78 MMHG | OXYGEN SATURATION: 91 % | SYSTOLIC BLOOD PRESSURE: 137 MMHG | RESPIRATION RATE: 23 BRPM

## 2025-01-22 LAB
ALBUMIN SERPL ELPH-MCNC: 1.4 G/DL — LOW (ref 3.3–5)
ALP SERPL-CCNC: 130 U/L — HIGH (ref 30–120)
ALT FLD-CCNC: 26 U/L — SIGNIFICANT CHANGE UP (ref 10–60)
ANION GAP SERPL CALC-SCNC: 8 MMOL/L — SIGNIFICANT CHANGE UP (ref 5–17)
AST SERPL-CCNC: 38 U/L — SIGNIFICANT CHANGE UP (ref 10–40)
BILIRUB SERPL-MCNC: 1 MG/DL — SIGNIFICANT CHANGE UP (ref 0.2–1.2)
BUN SERPL-MCNC: 17 MG/DL — SIGNIFICANT CHANGE UP (ref 7–23)
CALCIUM SERPL-MCNC: 8.1 MG/DL — LOW (ref 8.4–10.5)
CHLORIDE SERPL-SCNC: 104 MMOL/L — SIGNIFICANT CHANGE UP (ref 96–108)
CO2 SERPL-SCNC: 25 MMOL/L — SIGNIFICANT CHANGE UP (ref 22–31)
CREAT SERPL-MCNC: 0.92 MG/DL — SIGNIFICANT CHANGE UP (ref 0.5–1.3)
EGFR: 84 ML/MIN/1.73M2 — SIGNIFICANT CHANGE UP
GLUCOSE SERPL-MCNC: 112 MG/DL — HIGH (ref 70–99)
HCT VFR BLD CALC: 37.8 % — LOW (ref 39–50)
HGB BLD-MCNC: 12.5 G/DL — LOW (ref 13–17)
MCHC RBC-ENTMCNC: 28.5 PG — SIGNIFICANT CHANGE UP (ref 27–34)
MCHC RBC-ENTMCNC: 33.1 G/DL — SIGNIFICANT CHANGE UP (ref 32–36)
MCV RBC AUTO: 86.3 FL — SIGNIFICANT CHANGE UP (ref 80–100)
NRBC # BLD: 0 /100 WBCS — SIGNIFICANT CHANGE UP (ref 0–0)
NRBC BLD-RTO: 0 /100 WBCS — SIGNIFICANT CHANGE UP (ref 0–0)
PLATELET # BLD AUTO: 276 K/UL — SIGNIFICANT CHANGE UP (ref 150–400)
POTASSIUM SERPL-MCNC: 3.5 MMOL/L — SIGNIFICANT CHANGE UP (ref 3.5–5.3)
POTASSIUM SERPL-SCNC: 3.5 MMOL/L — SIGNIFICANT CHANGE UP (ref 3.5–5.3)
PROT SERPL-MCNC: 6.2 G/DL — SIGNIFICANT CHANGE UP (ref 6–8.3)
RBC # BLD: 4.38 M/UL — SIGNIFICANT CHANGE UP (ref 4.2–5.8)
RBC # FLD: 16.1 % — HIGH (ref 10.3–14.5)
SODIUM SERPL-SCNC: 137 MMOL/L — SIGNIFICANT CHANGE UP (ref 135–145)
WBC # BLD: 15.17 K/UL — HIGH (ref 3.8–10.5)
WBC # FLD AUTO: 15.17 K/UL — HIGH (ref 3.8–10.5)

## 2025-01-22 PROCEDURE — 81001 URINALYSIS AUTO W/SCOPE: CPT

## 2025-01-22 PROCEDURE — 96365 THER/PROPH/DIAG IV INF INIT: CPT

## 2025-01-22 PROCEDURE — 93005 ELECTROCARDIOGRAM TRACING: CPT

## 2025-01-22 PROCEDURE — 86140 C-REACTIVE PROTEIN: CPT

## 2025-01-22 PROCEDURE — 96375 TX/PRO/DX INJ NEW DRUG ADDON: CPT

## 2025-01-22 PROCEDURE — 99285 EMERGENCY DEPT VISIT HI MDM: CPT

## 2025-01-22 PROCEDURE — 85025 COMPLETE CBC W/AUTO DIFF WBC: CPT

## 2025-01-22 PROCEDURE — 87899 AGENT NOS ASSAY W/OPTIC: CPT

## 2025-01-22 PROCEDURE — 71045 X-RAY EXAM CHEST 1 VIEW: CPT

## 2025-01-22 PROCEDURE — 85027 COMPLETE CBC AUTOMATED: CPT

## 2025-01-22 PROCEDURE — 83880 ASSAY OF NATRIURETIC PEPTIDE: CPT

## 2025-01-22 PROCEDURE — 93306 TTE W/DOPPLER COMPLETE: CPT

## 2025-01-22 PROCEDURE — 93356 MYOCRD STRAIN IMG SPCKL TRCK: CPT

## 2025-01-22 PROCEDURE — 94640 AIRWAY INHALATION TREATMENT: CPT

## 2025-01-22 PROCEDURE — 0225U NFCT DS DNA&RNA 21 SARSCOV2: CPT

## 2025-01-22 PROCEDURE — 80053 COMPREHEN METABOLIC PANEL: CPT

## 2025-01-22 PROCEDURE — 84443 ASSAY THYROID STIM HORMONE: CPT

## 2025-01-22 PROCEDURE — 83735 ASSAY OF MAGNESIUM: CPT

## 2025-01-22 PROCEDURE — 36415 COLL VENOUS BLD VENIPUNCTURE: CPT

## 2025-01-22 PROCEDURE — 87449 NOS EACH ORGANISM AG IA: CPT

## 2025-01-22 PROCEDURE — 85610 PROTHROMBIN TIME: CPT

## 2025-01-22 PROCEDURE — 99239 HOSP IP/OBS DSCHRG MGMT >30: CPT

## 2025-01-22 PROCEDURE — 84484 ASSAY OF TROPONIN QUANT: CPT

## 2025-01-22 PROCEDURE — 87040 BLOOD CULTURE FOR BACTERIA: CPT

## 2025-01-22 PROCEDURE — 71250 CT THORAX DX C-: CPT | Mod: MC

## 2025-01-22 PROCEDURE — 85730 THROMBOPLASTIN TIME PARTIAL: CPT

## 2025-01-22 PROCEDURE — 96361 HYDRATE IV INFUSION ADD-ON: CPT

## 2025-01-22 PROCEDURE — 82803 BLOOD GASES ANY COMBINATION: CPT

## 2025-01-22 PROCEDURE — 83605 ASSAY OF LACTIC ACID: CPT

## 2025-01-22 RX ORDER — ALBUTEROL 90 MCG
2 AEROSOL REFILL (GRAM) INHALATION
Qty: 1 | Refills: 0
Start: 2025-01-22 | End: 2025-02-20

## 2025-01-22 RX ORDER — CEFPODOXIME PROXETIL 200 MG
1 TABLET ORAL
Qty: 10 | Refills: 0
Start: 2025-01-22 | End: 2025-01-26

## 2025-01-22 RX ORDER — METOPROLOL SUCCINATE 25 MG
1 TABLET, EXTENDED RELEASE 24 HR ORAL
Qty: 60 | Refills: 0
Start: 2025-01-22 | End: 2025-02-20

## 2025-01-22 RX ORDER — APIXABAN 5 MG/1
1 TABLET, FILM COATED ORAL
Qty: 60 | Refills: 0
Start: 2025-01-22 | End: 2025-02-20

## 2025-01-22 RX ORDER — TAMSULOSIN HYDROCHLORIDE 0.4 MG/1
1 CAPSULE ORAL
Qty: 30 | Refills: 0
Start: 2025-01-22 | End: 2025-02-20

## 2025-01-22 RX ADMIN — APIXABAN 5 MILLIGRAM(S): 5 TABLET, FILM COATED ORAL at 17:05

## 2025-01-22 RX ADMIN — Medication 25 MILLIGRAM(S): at 06:22

## 2025-01-22 RX ADMIN — CEFTRIAXONE 100 MILLIGRAM(S): 250 INJECTION, POWDER, FOR SOLUTION INTRAMUSCULAR; INTRAVENOUS at 16:46

## 2025-01-22 RX ADMIN — Medication 25 MILLIGRAM(S): at 17:06

## 2025-01-22 RX ADMIN — APIXABAN 5 MILLIGRAM(S): 5 TABLET, FILM COATED ORAL at 06:21

## 2025-01-22 NOTE — DISCHARGE NOTE NURSING/CASE MANAGEMENT/SOCIAL WORK - PATIENT PORTAL LINK FT
You can access the FollowMyHealth Patient Portal offered by [facility] by registering at the following website: [email or web address]. By joining [facility]’s FollowMyHealth portal, you will also be able to view your health information using other applications (apps) compatible with our system.

## 2025-01-22 NOTE — PROGRESS NOTE ADULT - SUBJECTIVE AND OBJECTIVE BOX
Date/Time Patient Seen:  		  Referring MD:   Data Reviewed	       Patient is a 79y old  Male who presents with a chief complaint of shortness of breath (21 Jan 2025 16:42)      Subjective/HPI     PAST MEDICAL & SURGICAL HISTORY:  Benign colonic polyp    Benign essential hypertension    Pure hypercholesterolemia    Other acute pulmonary embolism    No Past Surgical History          Medication list         MEDICATIONS  (STANDING):  apixaban 5 milliGRAM(s) Oral two times a day  cefTRIAXone   IVPB      cefTRIAXone   IVPB 2000 milliGRAM(s) IV Intermittent every 24 hours  metoprolol tartrate 25 milliGRAM(s) Oral two times a day  tamsulosin 0.4 milliGRAM(s) Oral at bedtime    MEDICATIONS  (PRN):  acetaminophen     Tablet .. 650 milliGRAM(s) Oral every 6 hours PRN Temp greater or equal to 38C (100.4F), Mild Pain (1 - 3)  albuterol    90 MICROgram(s) HFA Inhaler 2 Puff(s) Inhalation every 6 hours PRN Shortness of Breath and/or Wheezing  albuterol/ipratropium for Nebulization 3 milliLiter(s) Nebulizer every 6 hours PRN Shortness of Breath and/or Wheezing  aluminum hydroxide/magnesium hydroxide/simethicone Suspension 30 milliLiter(s) Oral every 4 hours PRN Dyspepsia  guaiFENesin Oral Liquid (Sugar-Free) 200 milliGRAM(s) Oral every 6 hours PRN Cough         Vitals log        ICU Vital Signs Last 24 Hrs  T(C): 36.4 (22 Jan 2025 00:06), Max: 37.5 (21 Jan 2025 16:04)  T(F): 97.5 (22 Jan 2025 00:06), Max: 99.5 (21 Jan 2025 16:04)  HR: 76 (22 Jan 2025 05:05) (72 - 93)  BP: 135/89 (22 Jan 2025 05:05) (123/77 - 145/95)  BP(mean): 103 (22 Jan 2025 05:05) (90 - 110)  ABP: --  ABP(mean): --  RR: 28 (22 Jan 2025 01:16) (27 - 44)  SpO2: 90% (22 Jan 2025 05:05) (90% - 97%)    O2 Parameters below as of 22 Jan 2025 05:05  Patient On (Oxygen Delivery Method): room air                 Input and Output:  I&O's Detail    20 Jan 2025 07:01  -  21 Jan 2025 07:00  --------------------------------------------------------  IN:    IV PiggyBack: 50 mL    Lactated Ringers: 300 mL    Oral Fluid: 220 mL  Total IN: 570 mL    OUT:    Voided (mL): 725 mL  Total OUT: 725 mL    Total NET: -155 mL      21 Jan 2025 07:01  -  22 Jan 2025 05:35  --------------------------------------------------------  IN:    IV PiggyBack: 50 mL  Total IN: 50 mL    OUT:    Voided (mL): 1150 mL  Total OUT: 1150 mL    Total NET: -1100 mL          Lab Data                        12.3   18.13 )-----------( 267      ( 20 Jan 2025 06:00 )             36.5     01-20    138  |  105  |  20  ----------------------------<  109[H]  3.8   |  25  |  0.96    Ca    8.2[L]      20 Jan 2025 06:00    TPro  5.5[L]  /  Alb  1.4[L]  /  TBili  1.1  /  DBili  x   /  AST  57[H]  /  ALT  32  /  AlkPhos  141[H]  01-20            Review of Systems	      Objective     Physical Examination    heart s1s2  lung dec BS  head nc  head at      Pertinent Lab findings & Imaging      Brannon:  NO   Adequate UO     I&O's Detail    20 Jan 2025 07:01  -  21 Jan 2025 07:00  --------------------------------------------------------  IN:    IV PiggyBack: 50 mL    Lactated Ringers: 300 mL    Oral Fluid: 220 mL  Total IN: 570 mL    OUT:    Voided (mL): 725 mL  Total OUT: 725 mL    Total NET: -155 mL      21 Jan 2025 07:01  -  22 Jan 2025 05:35  --------------------------------------------------------  IN:    IV PiggyBack: 50 mL  Total IN: 50 mL    OUT:    Voided (mL): 1150 mL  Total OUT: 1150 mL    Total NET: -1100 mL               Discussed with:     Cultures:	        Radiology

## 2025-01-22 NOTE — GOALS OF CARE CONVERSATION - ADVANCED CARE PLANNING - CONVERSATION DETAILS
PC spoke to pt about events leading to hospitalization and diagnosis PNA, esophogeal Ca hx, afib. Inquired about his wishes regarding escalation of medical care specifically cardiopulmonary resuscitation. He states he understands completely and is inclined to say he does not wish cosmo have CPR ateempted but will not agree to DNR until he speaks to his family. States he will notify medical team if he makes a decision.
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
## 2025-01-22 NOTE — DISCHARGE NOTE PROVIDER - ATTENDING DISCHARGE PHYSICAL EXAMINATION:
GENERAL: NAD, well-groomed, well-developed  HEAD:  Atraumatic, Normocephalic  EYES: EOMI, PERRLA, conjunctiva and sclera clear  ENMT: Moist mucous membranes,   NECK: Supple, No JVD, Normal thyroid  NERVOUS SYSTEM:  Alert & Oriented X 3  CHEST/LUNG: decreased air entry and b/l crackles  HEART: Regular rate and rhythm; No murmurs, rubs, or gallops  ABDOMEN: Soft, Nontender, Nondistended; Bowel sounds present  EXTREMITIES:  2+ Peripheral Pulses, No clubbing, cyanosis, or edema  SKIN: No rashes or lesions

## 2025-01-22 NOTE — PROGRESS NOTE ADULT - PROVIDER SPECIALTY LIST ADULT
Cardiology
Cardiology
Hospitalist
Pulmonology
Cardiology
Hospitalist
Hospitalist
Infectious Disease
Pulmonology

## 2025-01-22 NOTE — CASE MANAGEMENT PROGRESS NOTE - NSCMPROGRESSNOTE_GEN_ALL_CORE
FINAL DISCHARGE NOTE  DC to home with [facility] as per patient choice; patient & spouse [place] [phone] at bedside; in agreement with DC; discussed IMM and patient appeal rights; both parties verbalized understanding; Patient pending last dose of IV abx per Attending; accepted by [facility]; spouse to transport patient home at DC;

## 2025-01-22 NOTE — DISCHARGE NOTE NURSING/CASE MANAGEMENT/SOCIAL WORK - FINANCIAL ASSISTANCE
[facility] provides services at a reduced cost to those who are determined to be eligible through [facility]’s financial assistance program. Information regarding [facility]’s financial assistance program can be found by going to [email or web address] or by calling [phone].

## 2025-01-22 NOTE — PROGRESS NOTE ADULT - SUBJECTIVE AND OBJECTIVE BOX
Chief Complaint: Palpitations, cough    Interval Events: No events overnight.    Review of Systems:  General: No fevers, chills, weight gain  Skin: No rashes, color changes  Cardiovascular: No chest pain, orthopnea  Respiratory: No shortness of breath, cough  Gastrointestinal: No nausea, abdominal pain  Genitourinary: No incontinence, pain with urination  Musculoskeletal: No pain, swelling, decreased range of motion  Neurological: No headache, weakness  Psychiatric: No depression, anxiety  Endocrine: No weight gain, increased thirst  All other systems are comprehensively negative.    Physical Exam:  Vital Signs Last 24 Hrs  T(C): 36.5 (22 Jan 2025 08:00), Max: 37.5 (21 Jan 2025 16:04)  T(F): 97.7 (22 Jan 2025 08:00), Max: 99.5 (21 Jan 2025 16:04)  HR: 76 (22 Jan 2025 05:05) (72 - 93)  BP: 135/89 (22 Jan 2025 05:05) (123/77 - 142/76)  BP(mean): 103 (22 Jan 2025 05:05) (90 - 103)  RR: 28 (22 Jan 2025 01:16) (27 - 44)  SpO2: 90% (22 Jan 2025 05:05) (90% - 97%)  Parameters below as of 22 Jan 2025 05:05  Patient On (Oxygen Delivery Method): room air  General: NAD  HEENT: MMM  Neck: No JVD, no carotid bruit  Lungs: CTAB  CV: RRR, nl S1/S2, no M/R/G  Abdomen: S/NT/ND, +BS  Extremities: No LE edema, no cyanosis  Neuro: AAOx3, non-focal  Skin: No rash    Labs:             01-22    137  |  104  |  17  ----------------------------<  112[H]  3.5   |  25  |  0.92    Ca    8.1[L]      22 Jan 2025 06:00    TPro  6.2  /  Alb  1.4[L]  /  TBili  1.0  /  DBili  x   /  AST  38  /  ALT  26  /  AlkPhos  130[H]  01-22                        12.5   15.17 )-----------( 276      ( 22 Jan 2025 06:00 )             37.8       ECG/Telemetry: Sinus rhythm
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
## 2025-01-22 NOTE — DISCHARGE NOTE PROVIDER - NSDCMRMEDTOKEN_GEN_ALL_CORE_FT
atorvastatin 20 mg oral tablet: 1 tab(s) orally once a day   atorvastatin 20 mg oral tablet: 1 tab(s) orally once a day  tamsulosin 0.4 mg oral capsule: 1 cap(s) orally once a day (at bedtime)   albuterol 90 mcg/inh inhalation aerosol: 2 puff(s) inhaled every 6 hours as needed for Shortness of Breath and/or Wheezing  apixaban 5 mg oral tablet: 1 tab(s) orally 2 times a day  atorvastatin 20 mg oral tablet: 1 tab(s) orally once a day  cefpodoxime 200 mg oral tablet: 1 tab(s) orally 2 times a day  guaiFENesin 100 mg/5 mL oral liquid: 10 milliliter(s) orally every 6 hours as needed for Cough  metoprolol tartrate 25 mg oral tablet: 1 tab(s) orally 2 times a day  tamsulosin 0.4 mg oral capsule: 1 cap(s) orally once a day (at bedtime)

## 2025-01-22 NOTE — DISCHARGE NOTE NURSING/CASE MANAGEMENT/SOCIAL WORK - NSSCTYPOFSERV_GEN_ALL_CORE
[facility]/ Home Care Services with [facility] ( / [phone] ) Home Care PT OR RN will call within 24/48 hrs of DC from the hospital to set up Initial Assessment.

## 2025-01-23 PROBLEM — I26.99 OTHER PULMONARY EMBOLISM WITHOUT ACUTE COR PULMONALE: Chronic | Status: ACTIVE | Noted: 2025-01-18

## 2025-01-23 LAB
CULTURE RESULTS: SIGNIFICANT CHANGE UP
CULTURE RESULTS: SIGNIFICANT CHANGE UP
SPECIMEN SOURCE: SIGNIFICANT CHANGE UP
SPECIMEN SOURCE: SIGNIFICANT CHANGE UP

## 2025-01-27 ENCOUNTER — APPOINTMENT (OUTPATIENT)
Dept: CARDIOLOGY | Facility: CLINIC | Age: 80
End: 2025-01-27
Payer: MEDICARE

## 2025-01-27 ENCOUNTER — NON-APPOINTMENT (OUTPATIENT)
Age: 80
End: 2025-01-27

## 2025-01-27 VITALS
BODY MASS INDEX: 23.65 KG/M2 | HEART RATE: 70 BPM | SYSTOLIC BLOOD PRESSURE: 116 MMHG | OXYGEN SATURATION: 96 % | WEIGHT: 151 LBS | DIASTOLIC BLOOD PRESSURE: 70 MMHG

## 2025-01-27 PROCEDURE — G2211 COMPLEX E/M VISIT ADD ON: CPT

## 2025-01-27 PROCEDURE — 99215 OFFICE O/P EST HI 40 MIN: CPT

## 2025-01-27 PROCEDURE — 93000 ELECTROCARDIOGRAM COMPLETE: CPT

## 2025-01-27 RX ORDER — METOPROLOL TARTRATE 25 MG/1
25 TABLET ORAL TWICE DAILY
Qty: 180 | Refills: 3 | Status: ACTIVE | COMMUNITY
Start: 2025-01-27

## 2025-01-27 RX ORDER — TAMSULOSIN HYDROCHLORIDE 0.4 MG/1
0.4 CAPSULE ORAL
Refills: 0 | Status: ACTIVE | COMMUNITY
Start: 2025-01-27

## 2025-01-29 ENCOUNTER — APPOINTMENT (OUTPATIENT)
Dept: CARDIOLOGY | Facility: CLINIC | Age: 80
End: 2025-01-29
Payer: MEDICARE

## 2025-01-29 DIAGNOSIS — D64.9 ANEMIA, UNSPECIFIED: ICD-10-CM

## 2025-01-29 DIAGNOSIS — I49.1 ATRIAL PREMATURE DEPOLARIZATION: ICD-10-CM

## 2025-01-29 DIAGNOSIS — R93.5 ABNORMAL FINDINGS ON DIAGNOSTIC IMAGING OF OTHER ABDOMINAL REGIONS, INCLUDING RETROPERITONEUM: ICD-10-CM

## 2025-01-29 DIAGNOSIS — R03.0 ELEVATED BLOOD-PRESSURE READING, W/OUT DIAGNOSIS OF HYPERTENSION: ICD-10-CM

## 2025-01-29 DIAGNOSIS — I10 ESSENTIAL (PRIMARY) HYPERTENSION: ICD-10-CM

## 2025-01-29 DIAGNOSIS — I48.91 UNSPECIFIED ATRIAL FIBRILLATION: ICD-10-CM

## 2025-01-29 DIAGNOSIS — Z87.898 PERSONAL HISTORY OF OTHER SPECIFIED CONDITIONS: ICD-10-CM

## 2025-01-29 DIAGNOSIS — R06.09 OTHER FORMS OF DYSPNEA: ICD-10-CM

## 2025-01-29 DIAGNOSIS — I27.20 PULMONARY HYPERTENSION, UNSPECIFIED: ICD-10-CM

## 2025-01-29 DIAGNOSIS — Z86.79 PERSONAL HISTORY OF OTHER DISEASES OF THE CIRCULATORY SYSTEM: ICD-10-CM

## 2025-01-29 DIAGNOSIS — I25.10 ATHEROSCLEROTIC HEART DISEASE OF NATIVE CORONARY ARTERY W/OUT ANGINA PECTORIS: ICD-10-CM

## 2025-01-29 DIAGNOSIS — Z87.09 PERSONAL HISTORY OF OTHER DISEASES OF THE RESPIRATORY SYSTEM: ICD-10-CM

## 2025-01-29 DIAGNOSIS — M25.473 EFFUSION, UNSPECIFIED ANKLE: ICD-10-CM

## 2025-01-29 DIAGNOSIS — E78.5 HYPERLIPIDEMIA, UNSPECIFIED: ICD-10-CM

## 2025-01-29 PROCEDURE — 99213 OFFICE O/P EST LOW 20 MIN: CPT | Mod: 2W

## 2025-01-29 PROCEDURE — G2211 COMPLEX E/M VISIT ADD ON: CPT | Mod: 2W

## 2025-01-29 RX ORDER — FUROSEMIDE 20 MG/1
20 TABLET ORAL DAILY
Qty: 30 | Refills: 0 | Status: DISCONTINUED | COMMUNITY
Start: 2025-01-27 | End: 2025-01-29

## 2025-01-29 RX ORDER — APIXABAN 5 MG/1
5 TABLET, FILM COATED ORAL
Qty: 60 | Refills: 0 | Status: ACTIVE | COMMUNITY
Start: 2025-01-22

## 2025-01-30 ENCOUNTER — NON-APPOINTMENT (OUTPATIENT)
Age: 80
End: 2025-01-30

## 2025-02-26 ENCOUNTER — APPOINTMENT (OUTPATIENT)
Dept: INTERNAL MEDICINE | Facility: CLINIC | Age: 80
End: 2025-02-26
Payer: MEDICARE

## 2025-02-26 VITALS
WEIGHT: 150 LBS | BODY MASS INDEX: 22.73 KG/M2 | SYSTOLIC BLOOD PRESSURE: 125 MMHG | HEART RATE: 99 BPM | TEMPERATURE: 97.6 F | OXYGEN SATURATION: 98 % | DIASTOLIC BLOOD PRESSURE: 75 MMHG | HEIGHT: 68 IN

## 2025-02-26 DIAGNOSIS — I48.91 UNSPECIFIED ATRIAL FIBRILLATION: ICD-10-CM

## 2025-02-26 DIAGNOSIS — R73.03 PREDIABETES.: ICD-10-CM

## 2025-02-26 DIAGNOSIS — J18.9 PNEUMONIA, UNSPECIFIED ORGANISM: ICD-10-CM

## 2025-02-26 DIAGNOSIS — I25.10 ATHEROSCLEROTIC HEART DISEASE OF NATIVE CORONARY ARTERY W/OUT ANGINA PECTORIS: ICD-10-CM

## 2025-02-26 DIAGNOSIS — I27.20 PULMONARY HYPERTENSION, UNSPECIFIED: ICD-10-CM

## 2025-02-26 DIAGNOSIS — E78.5 HYPERLIPIDEMIA, UNSPECIFIED: ICD-10-CM

## 2025-02-26 DIAGNOSIS — D72.829 ELEVATED WHITE BLOOD CELL COUNT, UNSPECIFIED: ICD-10-CM

## 2025-02-26 DIAGNOSIS — E83.51 HYPOCALCEMIA: ICD-10-CM

## 2025-02-26 DIAGNOSIS — C15.9 MALIGNANT NEOPLASM OF ESOPHAGUS, UNSPECIFIED: ICD-10-CM

## 2025-02-26 PROCEDURE — G2211 COMPLEX E/M VISIT ADD ON: CPT

## 2025-02-26 PROCEDURE — 93000 ELECTROCARDIOGRAM COMPLETE: CPT

## 2025-02-26 PROCEDURE — 36415 COLL VENOUS BLD VENIPUNCTURE: CPT

## 2025-02-26 PROCEDURE — 99215 OFFICE O/P EST HI 40 MIN: CPT

## 2025-02-27 ENCOUNTER — NON-APPOINTMENT (OUTPATIENT)
Age: 80
End: 2025-02-27

## 2025-02-27 ENCOUNTER — APPOINTMENT (OUTPATIENT)
Dept: ELECTROPHYSIOLOGY | Facility: CLINIC | Age: 80
End: 2025-02-27
Payer: MEDICARE

## 2025-02-27 VITALS
HEART RATE: 72 BPM | DIASTOLIC BLOOD PRESSURE: 73 MMHG | BODY MASS INDEX: 22.58 KG/M2 | WEIGHT: 149 LBS | OXYGEN SATURATION: 98 % | HEIGHT: 68 IN | SYSTOLIC BLOOD PRESSURE: 114 MMHG

## 2025-02-27 DIAGNOSIS — I48.0 PAROXYSMAL ATRIAL FIBRILLATION: ICD-10-CM

## 2025-02-27 LAB
ALBUMIN SERPL ELPH-MCNC: 3.8 G/DL
ALP BLD-CCNC: 98 U/L
ALT SERPL-CCNC: 15 U/L
ANION GAP SERPL CALC-SCNC: 9 MMOL/L
AST SERPL-CCNC: 16 U/L
BASOPHILS # BLD AUTO: 0.1 K/UL
BASOPHILS NFR BLD AUTO: 1.3 %
BILIRUB SERPL-MCNC: 0.3 MG/DL
BUN SERPL-MCNC: 16 MG/DL
CALCIUM SERPL-MCNC: 9.3 MG/DL
CHLORIDE SERPL-SCNC: 107 MMOL/L
CO2 SERPL-SCNC: 25 MMOL/L
CREAT SERPL-MCNC: 0.94 MG/DL
EGFR: 81 ML/MIN/1.73M2
EOSINOPHIL # BLD AUTO: 0.56 K/UL
EOSINOPHIL NFR BLD AUTO: 7.5 %
ESTIMATED AVERAGE GLUCOSE: 126 MG/DL
GLUCOSE SERPL-MCNC: 122 MG/DL
HBA1C MFR BLD HPLC: 6 %
HCT VFR BLD CALC: 41.1 %
HGB BLD-MCNC: 13.3 G/DL
IMM GRANULOCYTES NFR BLD AUTO: 0.3 %
LYMPHOCYTES # BLD AUTO: 1.5 K/UL
LYMPHOCYTES NFR BLD AUTO: 20.1 %
MAN DIFF?: NORMAL
MCHC RBC-ENTMCNC: 29.9 PG
MCHC RBC-ENTMCNC: 32.4 G/DL
MCV RBC AUTO: 92.4 FL
MONOCYTES # BLD AUTO: 0.57 K/UL
MONOCYTES NFR BLD AUTO: 7.7 %
NEUTROPHILS # BLD AUTO: 4.7 K/UL
NEUTROPHILS NFR BLD AUTO: 63.1 %
PLATELET # BLD AUTO: 220 K/UL
POTASSIUM SERPL-SCNC: 4.3 MMOL/L
PROT SERPL-MCNC: 6.7 G/DL
RBC # BLD: 4.45 M/UL
RBC # FLD: 17.2 %
SODIUM SERPL-SCNC: 141 MMOL/L
WBC # FLD AUTO: 7.45 K/UL

## 2025-02-27 PROCEDURE — 99205 OFFICE O/P NEW HI 60 MIN: CPT

## 2025-02-27 PROCEDURE — 93000 ELECTROCARDIOGRAM COMPLETE: CPT

## 2025-03-24 ENCOUNTER — APPOINTMENT (OUTPATIENT)
Dept: CARDIOLOGY | Facility: CLINIC | Age: 80
End: 2025-03-24
Payer: MEDICARE

## 2025-03-24 VITALS
WEIGHT: 150 LBS | HEART RATE: 76 BPM | DIASTOLIC BLOOD PRESSURE: 78 MMHG | SYSTOLIC BLOOD PRESSURE: 138 MMHG | BODY MASS INDEX: 22.73 KG/M2 | HEIGHT: 68 IN

## 2025-03-24 DIAGNOSIS — I25.10 ATHEROSCLEROTIC HEART DISEASE OF NATIVE CORONARY ARTERY W/OUT ANGINA PECTORIS: ICD-10-CM

## 2025-03-24 DIAGNOSIS — I48.0 PAROXYSMAL ATRIAL FIBRILLATION: ICD-10-CM

## 2025-03-24 DIAGNOSIS — I07.1 RHEUMATIC TRICUSPID INSUFFICIENCY: ICD-10-CM

## 2025-03-24 DIAGNOSIS — E78.5 HYPERLIPIDEMIA, UNSPECIFIED: ICD-10-CM

## 2025-03-24 DIAGNOSIS — I27.20 PULMONARY HYPERTENSION, UNSPECIFIED: ICD-10-CM

## 2025-03-24 PROCEDURE — G2211 COMPLEX E/M VISIT ADD ON: CPT

## 2025-03-24 PROCEDURE — 99214 OFFICE O/P EST MOD 30 MIN: CPT

## 2025-03-25 LAB
25(OH)D3 SERPL-MCNC: 20.3 NG/ML
CHOLEST SERPL-MCNC: 153 MG/DL
HDLC SERPL-MCNC: 74 MG/DL
LDLC SERPL-MCNC: 68 MG/DL
NONHDLC SERPL-MCNC: 79 MG/DL
TRIGL SERPL-MCNC: 52 MG/DL

## 2025-03-26 LAB — APO LP(A) SERPL-MCNC: <9 NMOL/L

## 2025-04-10 ENCOUNTER — APPOINTMENT (OUTPATIENT)
Dept: ELECTROPHYSIOLOGY | Facility: CLINIC | Age: 80
End: 2025-04-10

## 2025-05-16 ENCOUNTER — APPOINTMENT (OUTPATIENT)
Dept: INTERNAL MEDICINE | Facility: CLINIC | Age: 80
End: 2025-05-16
Payer: MEDICARE

## 2025-05-16 VITALS
OXYGEN SATURATION: 98 % | WEIGHT: 151 LBS | SYSTOLIC BLOOD PRESSURE: 148 MMHG | BODY MASS INDEX: 22.88 KG/M2 | HEART RATE: 69 BPM | HEIGHT: 68 IN | DIASTOLIC BLOOD PRESSURE: 84 MMHG

## 2025-05-16 VITALS — DIASTOLIC BLOOD PRESSURE: 88 MMHG | SYSTOLIC BLOOD PRESSURE: 147 MMHG

## 2025-05-16 DIAGNOSIS — R73.03 PREDIABETES.: ICD-10-CM

## 2025-05-16 DIAGNOSIS — I27.20 PULMONARY HYPERTENSION, UNSPECIFIED: ICD-10-CM

## 2025-05-16 DIAGNOSIS — I25.10 ATHEROSCLEROTIC HEART DISEASE OF NATIVE CORONARY ARTERY W/OUT ANGINA PECTORIS: ICD-10-CM

## 2025-05-16 DIAGNOSIS — Z01.818 ENCOUNTER FOR OTHER PREPROCEDURAL EXAMINATION: ICD-10-CM

## 2025-05-16 DIAGNOSIS — Z80.0 FAMILY HISTORY OF MALIGNANT NEOPLASM OF DIGESTIVE ORGANS: ICD-10-CM

## 2025-05-16 DIAGNOSIS — J18.9 PNEUMONIA, UNSPECIFIED ORGANISM: ICD-10-CM

## 2025-05-16 DIAGNOSIS — R60.0 LOCALIZED EDEMA: ICD-10-CM

## 2025-05-16 DIAGNOSIS — Z82.49 FAMILY HISTORY OF ISCHEMIC HEART DISEASE AND OTHER DISEASES OF THE CIRCULATORY SYSTEM: ICD-10-CM

## 2025-05-16 DIAGNOSIS — F10.90 ALCOHOL USE, UNSPECIFIED, UNCOMPLICATED: ICD-10-CM

## 2025-05-16 DIAGNOSIS — I48.0 PAROXYSMAL ATRIAL FIBRILLATION: ICD-10-CM

## 2025-05-16 DIAGNOSIS — E78.5 HYPERLIPIDEMIA, UNSPECIFIED: ICD-10-CM

## 2025-05-16 DIAGNOSIS — C15.9 MALIGNANT NEOPLASM OF ESOPHAGUS, UNSPECIFIED: ICD-10-CM

## 2025-05-16 PROCEDURE — 99214 OFFICE O/P EST MOD 30 MIN: CPT

## 2025-05-16 PROCEDURE — 93000 ELECTROCARDIOGRAM COMPLETE: CPT

## 2025-05-16 PROCEDURE — G2211 COMPLEX E/M VISIT ADD ON: CPT

## 2025-05-16 PROCEDURE — 36415 COLL VENOUS BLD VENIPUNCTURE: CPT

## 2025-05-20 ENCOUNTER — EMERGENCY (EMERGENCY)
Facility: HOSPITAL | Age: 80
LOS: 1 days | End: 2025-05-20
Attending: EMERGENCY MEDICINE | Admitting: EMERGENCY MEDICINE
Payer: COMMERCIAL

## 2025-05-20 ENCOUNTER — APPOINTMENT (OUTPATIENT)
Dept: ULTRASOUND IMAGING | Facility: CLINIC | Age: 80
End: 2025-05-20

## 2025-05-20 VITALS
WEIGHT: 149.91 LBS | HEART RATE: 71 BPM | OXYGEN SATURATION: 99 % | HEIGHT: 68 IN | TEMPERATURE: 98 F | DIASTOLIC BLOOD PRESSURE: 79 MMHG | RESPIRATION RATE: 15 BRPM | SYSTOLIC BLOOD PRESSURE: 144 MMHG

## 2025-05-20 VITALS
SYSTOLIC BLOOD PRESSURE: 137 MMHG | TEMPERATURE: 98 F | OXYGEN SATURATION: 98 % | HEART RATE: 69 BPM | DIASTOLIC BLOOD PRESSURE: 77 MMHG | RESPIRATION RATE: 15 BRPM

## 2025-05-20 LAB
ALBUMIN SERPL ELPH-MCNC: 3.3 G/DL — SIGNIFICANT CHANGE UP (ref 3.3–5)
ALBUMIN SERPL ELPH-MCNC: 3.9 G/DL
ALP BLD-CCNC: 99 U/L
ALP SERPL-CCNC: 96 U/L — SIGNIFICANT CHANGE UP (ref 30–120)
ALT FLD-CCNC: 22 U/L — SIGNIFICANT CHANGE UP (ref 10–60)
ALT SERPL-CCNC: 16 U/L
ANION GAP SERPL CALC-SCNC: 14 MMOL/L
ANION GAP SERPL CALC-SCNC: 6 MMOL/L — SIGNIFICANT CHANGE UP (ref 5–17)
APTT BLD: 30.1 SEC — SIGNIFICANT CHANGE UP (ref 26.1–36.8)
APTT BLD: 30.5 SEC
AST SERPL-CCNC: 25 U/L
AST SERPL-CCNC: 25 U/L — SIGNIFICANT CHANGE UP (ref 10–40)
BASOPHILS # BLD AUTO: 0.06 K/UL — SIGNIFICANT CHANGE UP (ref 0–0.2)
BASOPHILS # BLD AUTO: 0.08 K/UL
BASOPHILS NFR BLD AUTO: 0.7 % — SIGNIFICANT CHANGE UP (ref 0–2)
BASOPHILS NFR BLD AUTO: 0.8 %
BILIRUB SERPL-MCNC: 0.6 MG/DL
BILIRUB SERPL-MCNC: 0.7 MG/DL — SIGNIFICANT CHANGE UP (ref 0.2–1.2)
BUN SERPL-MCNC: 18 MG/DL
BUN SERPL-MCNC: 20 MG/DL — SIGNIFICANT CHANGE UP (ref 7–23)
CALCIUM SERPL-MCNC: 9.2 MG/DL — SIGNIFICANT CHANGE UP (ref 8.4–10.5)
CALCIUM SERPL-MCNC: 9.7 MG/DL
CHLORIDE SERPL-SCNC: 104 MMOL/L
CHLORIDE SERPL-SCNC: 108 MMOL/L — SIGNIFICANT CHANGE UP (ref 96–108)
CK SERPL-CCNC: 150 U/L — SIGNIFICANT CHANGE UP (ref 39–308)
CK SERPL-CCNC: 320 U/L — HIGH (ref 39–308)
CO2 SERPL-SCNC: 23 MMOL/L
CO2 SERPL-SCNC: 28 MMOL/L — SIGNIFICANT CHANGE UP (ref 22–31)
CREAT SERPL-MCNC: 0.95 MG/DL
CREAT SERPL-MCNC: 1.11 MG/DL — SIGNIFICANT CHANGE UP (ref 0.5–1.3)
EGFR: 67 ML/MIN/1.73M2 — SIGNIFICANT CHANGE UP
EGFR: 67 ML/MIN/1.73M2 — SIGNIFICANT CHANGE UP
EGFRCR SERPLBLD CKD-EPI 2021: 80 ML/MIN/1.73M2
EOSINOPHIL # BLD AUTO: 0.4 K/UL — SIGNIFICANT CHANGE UP (ref 0–0.5)
EOSINOPHIL # BLD AUTO: 0.49 K/UL
EOSINOPHIL NFR BLD AUTO: 4.6 % — SIGNIFICANT CHANGE UP (ref 0–6)
EOSINOPHIL NFR BLD AUTO: 5.2 %
GLUCOSE SERPL-MCNC: 115 MG/DL — HIGH (ref 70–99)
GLUCOSE SERPL-MCNC: 95 MG/DL
HCT VFR BLD CALC: 40.7 % — SIGNIFICANT CHANGE UP (ref 39–50)
HCT VFR BLD CALC: 43 %
HGB BLD-MCNC: 13 G/DL — SIGNIFICANT CHANGE UP (ref 13–17)
HGB BLD-MCNC: 13.7 G/DL
IMM GRANULOCYTES NFR BLD AUTO: 0.2 %
IMM GRANULOCYTES NFR BLD AUTO: 0.9 % — SIGNIFICANT CHANGE UP (ref 0–0.9)
INR BLD: 1.08 RATIO — SIGNIFICANT CHANGE UP (ref 0.85–1.16)
INR PPP: 0.94 RATIO
LIDOCAIN IGE QN: 64 U/L — SIGNIFICANT CHANGE UP (ref 16–77)
LYMPHOCYTES # BLD AUTO: 1.01 K/UL — SIGNIFICANT CHANGE UP (ref 1–3.3)
LYMPHOCYTES # BLD AUTO: 1.42 K/UL
LYMPHOCYTES # BLD AUTO: 11.7 % — LOW (ref 13–44)
LYMPHOCYTES NFR BLD AUTO: 15 %
MAN DIFF?: NORMAL
MCHC RBC-ENTMCNC: 28.8 PG — SIGNIFICANT CHANGE UP (ref 27–34)
MCHC RBC-ENTMCNC: 29.2 PG
MCHC RBC-ENTMCNC: 31.9 G/DL
MCHC RBC-ENTMCNC: 31.9 G/DL — LOW (ref 32–36)
MCV RBC AUTO: 90 FL — SIGNIFICANT CHANGE UP (ref 80–100)
MCV RBC AUTO: 91.7 FL
MONOCYTES # BLD AUTO: 0.46 K/UL — SIGNIFICANT CHANGE UP (ref 0–0.9)
MONOCYTES # BLD AUTO: 0.56 K/UL
MONOCYTES NFR BLD AUTO: 5.3 % — SIGNIFICANT CHANGE UP (ref 2–14)
MONOCYTES NFR BLD AUTO: 5.9 %
NEUTROPHILS # BLD AUTO: 6.65 K/UL — SIGNIFICANT CHANGE UP (ref 1.8–7.4)
NEUTROPHILS # BLD AUTO: 6.89 K/UL
NEUTROPHILS NFR BLD AUTO: 72.9 %
NEUTROPHILS NFR BLD AUTO: 76.8 % — SIGNIFICANT CHANGE UP (ref 43–77)
NRBC BLD AUTO-RTO: 0 /100 WBCS — SIGNIFICANT CHANGE UP (ref 0–0)
PLATELET # BLD AUTO: 189 K/UL — SIGNIFICANT CHANGE UP (ref 150–400)
PLATELET # BLD AUTO: 227 K/UL
POTASSIUM SERPL-MCNC: 4 MMOL/L — SIGNIFICANT CHANGE UP (ref 3.5–5.3)
POTASSIUM SERPL-SCNC: 4 MMOL/L — SIGNIFICANT CHANGE UP (ref 3.5–5.3)
POTASSIUM SERPL-SCNC: 4.2 MMOL/L
PROT SERPL-MCNC: 7.2 G/DL — SIGNIFICANT CHANGE UP (ref 6–8.3)
PROT SERPL-MCNC: 7.7 G/DL
PROTHROM AB SERPL-ACNC: 12.5 SEC — SIGNIFICANT CHANGE UP (ref 9.9–13.4)
PT BLD: 11.2 SEC
RBC # BLD: 4.52 M/UL — SIGNIFICANT CHANGE UP (ref 4.2–5.8)
RBC # BLD: 4.69 M/UL
RBC # FLD: 13.8 % — SIGNIFICANT CHANGE UP (ref 10.3–14.5)
RBC # FLD: 14.2 %
SODIUM SERPL-SCNC: 141 MMOL/L
SODIUM SERPL-SCNC: 142 MMOL/L — SIGNIFICANT CHANGE UP (ref 135–145)
TROPONIN I, HIGH SENSITIVITY RESULT: 5.2 NG/L — SIGNIFICANT CHANGE UP
TROPONIN I, HIGH SENSITIVITY RESULT: 8.5 NG/L — SIGNIFICANT CHANGE UP
WBC # BLD: 8.66 K/UL — SIGNIFICANT CHANGE UP (ref 3.8–10.5)
WBC # FLD AUTO: 8.66 K/UL — SIGNIFICANT CHANGE UP (ref 3.8–10.5)
WBC # FLD AUTO: 9.46 K/UL

## 2025-05-20 PROCEDURE — 99285 EMERGENCY DEPT VISIT HI MDM: CPT | Mod: 25

## 2025-05-20 PROCEDURE — 70450 CT HEAD/BRAIN W/O DYE: CPT

## 2025-05-20 PROCEDURE — 74177 CT ABD & PELVIS W/CONTRAST: CPT | Mod: 26

## 2025-05-20 PROCEDURE — 71275 CT ANGIOGRAPHY CHEST: CPT | Mod: 26

## 2025-05-20 PROCEDURE — 85025 COMPLETE CBC W/AUTO DIFF WBC: CPT

## 2025-05-20 PROCEDURE — 85610 PROTHROMBIN TIME: CPT

## 2025-05-20 PROCEDURE — 93010 ELECTROCARDIOGRAM REPORT: CPT | Mod: 76

## 2025-05-20 PROCEDURE — 36415 COLL VENOUS BLD VENIPUNCTURE: CPT

## 2025-05-20 PROCEDURE — 83690 ASSAY OF LIPASE: CPT

## 2025-05-20 PROCEDURE — 71275 CT ANGIOGRAPHY CHEST: CPT

## 2025-05-20 PROCEDURE — 80053 COMPREHEN METABOLIC PANEL: CPT

## 2025-05-20 PROCEDURE — 71045 X-RAY EXAM CHEST 1 VIEW: CPT

## 2025-05-20 PROCEDURE — 93005 ELECTROCARDIOGRAM TRACING: CPT

## 2025-05-20 PROCEDURE — 99285 EMERGENCY DEPT VISIT HI MDM: CPT

## 2025-05-20 PROCEDURE — 74177 CT ABD & PELVIS W/CONTRAST: CPT

## 2025-05-20 PROCEDURE — 82550 ASSAY OF CK (CPK): CPT

## 2025-05-20 PROCEDURE — 84484 ASSAY OF TROPONIN QUANT: CPT

## 2025-05-20 PROCEDURE — 71045 X-RAY EXAM CHEST 1 VIEW: CPT | Mod: 26

## 2025-05-20 PROCEDURE — 70450 CT HEAD/BRAIN W/O DYE: CPT | Mod: 26

## 2025-05-20 PROCEDURE — 85730 THROMBOPLASTIN TIME PARTIAL: CPT

## 2025-05-20 RX ADMIN — Medication 1000 MILLILITER(S): at 10:12
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
## 2025-05-20 NOTE — ED ADULT NURSE NOTE - NSFALLUNIVINTERV_ED_ALL_ED
Bed/Stretcher in lowest position, wheels locked, appropriate side rails in place/Call bell, personal items and telephone in reach/Instruct patient to call for assistance before getting out of bed/chair/stretcher/Non-slip footwear applied when patient is off stretcher/Glady to call system/Physically safe environment - no spills, clutter or unnecessary equipment/Purposeful proactive rounding/Room/bathroom lighting operational, light cord in reach Post-Care Instructions: I reviewed with the patient in detail post-care instructions. Patient is not to engage in any heavy lifting, exercise, or swimming for the next 14 days. Should the patient develop any fevers, chills, bleeding, severe pain patient will contact the office immediately. Patient to use ice and take OTC medication as needed for pain relief.

## 2025-05-20 NOTE — ED PROVIDER NOTE - CARE PROVIDER_API CALL
[facility]  Internal Medicine  [address] [place] [address] [address]  [place] [address]  Phone: [phone]  Fax: [phone]  Follow Up Time:

## 2025-05-20 NOTE — ED PROVIDER NOTE - DIFFERENTIAL DIAGNOSIS
Differential Diagnosis Rule out acute traumatic injury, traumatic dissection, MI, other acute pathology
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
## 2025-05-20 NOTE — ED PROVIDER NOTE - NSFOLLOWUPINSTRUCTIONS_ED_ALL_ED_FT
1. Follow-up with your Primary Medical doctor. Call today / next business day for prompt follow-up.  2. Return to Emergency room for any worsening or persistent pain, shortness of breath, chest pains, abdominal pain, numbness, tingling, headaches, vomiting, visual changes, dizziness, weakness, fever, or any other concerning symptoms.  3. See attached instruction sheets for additional information, including information regarding signs and symptoms to look out for, reasons to seek immediate care and other important instructions.  4.  Follow-up with your urologist as discussed  5.  Tylenol as needed for pain

## 2025-05-20 NOTE — ED PROVIDER NOTE - PATIENT PORTAL LINK FT
You can access the FollowMyHealth Patient Portal offered by  by registering at the following website: [email or web address]. By joining [facility]’s FollowMyHealth portal, you will also be able to view your health information using other applications (apps) compatible with our system.

## 2025-05-23 LAB
APPEARANCE: CLEAR
BILIRUBIN URINE: NEGATIVE
BLOOD URINE: NEGATIVE
COLOR: YELLOW
GLUCOSE QUALITATIVE U: NEGATIVE MG/DL
KETONES URINE: NEGATIVE MG/DL
LEUKOCYTE ESTERASE URINE: NEGATIVE
NITRITE URINE: NEGATIVE
PH URINE: 6
PROTEIN URINE: NORMAL MG/DL
SPECIFIC GRAVITY URINE: 1.03
UROBILINOGEN URINE: 0.2 MG/DL

## 2025-05-27 ENCOUNTER — NON-APPOINTMENT (OUTPATIENT)
Age: 80
End: 2025-05-27

## 2025-05-28 ENCOUNTER — NON-APPOINTMENT (OUTPATIENT)
Age: 80
End: 2025-05-28

## 2025-06-12 ENCOUNTER — NON-APPOINTMENT (OUTPATIENT)
Age: 80
End: 2025-06-12

## 2025-06-17 ENCOUNTER — APPOINTMENT (OUTPATIENT)
Dept: ULTRASOUND IMAGING | Facility: CLINIC | Age: 80
End: 2025-06-17

## 2025-06-17 ENCOUNTER — OUTPATIENT (OUTPATIENT)
Dept: OUTPATIENT SERVICES | Facility: HOSPITAL | Age: 80
LOS: 1 days | End: 2025-06-17
Payer: MEDICARE

## 2025-06-17 DIAGNOSIS — R60.0 LOCALIZED EDEMA: ICD-10-CM

## 2025-06-17 PROCEDURE — 93970 EXTREMITY STUDY: CPT

## 2025-06-17 PROCEDURE — 93970 EXTREMITY STUDY: CPT | Mod: 26

## 2025-06-19 ENCOUNTER — NON-APPOINTMENT (OUTPATIENT)
Age: 80
End: 2025-06-19

## 2025-07-01 ENCOUNTER — APPOINTMENT (OUTPATIENT)
Dept: INTERNAL MEDICINE | Facility: CLINIC | Age: 80
End: 2025-07-01
Payer: MEDICARE

## 2025-07-01 VITALS
DIASTOLIC BLOOD PRESSURE: 76 MMHG | WEIGHT: 148 LBS | HEART RATE: 62 BPM | OXYGEN SATURATION: 98 % | HEIGHT: 68 IN | SYSTOLIC BLOOD PRESSURE: 126 MMHG | BODY MASS INDEX: 22.43 KG/M2

## 2025-07-01 PROCEDURE — 36415 COLL VENOUS BLD VENIPUNCTURE: CPT

## 2025-07-01 PROCEDURE — 93000 ELECTROCARDIOGRAM COMPLETE: CPT

## 2025-07-01 PROCEDURE — 99214 OFFICE O/P EST MOD 30 MIN: CPT

## 2025-07-01 PROCEDURE — G2211 COMPLEX E/M VISIT ADD ON: CPT

## 2025-07-02 LAB
ALBUMIN SERPL ELPH-MCNC: 4 G/DL
ALP BLD-CCNC: 121 U/L
ALT SERPL-CCNC: 16 U/L
ANION GAP SERPL CALC-SCNC: 11 MMOL/L
APTT BLD: 32 SEC
AST SERPL-CCNC: 22 U/L
BASOPHILS # BLD AUTO: 0.05 K/UL
BASOPHILS NFR BLD AUTO: 0.6 %
BILIRUB SERPL-MCNC: 0.6 MG/DL
BUN SERPL-MCNC: 19 MG/DL
CALCIUM SERPL-MCNC: 9.3 MG/DL
CHLORIDE SERPL-SCNC: 108 MMOL/L
CO2 SERPL-SCNC: 25 MMOL/L
CREAT SERPL-MCNC: 1.06 MG/DL
EGFRCR SERPLBLD CKD-EPI 2021: 71 ML/MIN/1.73M2
EOSINOPHIL # BLD AUTO: 0.3 K/UL
EOSINOPHIL NFR BLD AUTO: 3.9 %
GLUCOSE SERPL-MCNC: 102 MG/DL
HCT VFR BLD CALC: 44.3 %
HGB BLD-MCNC: 13.8 G/DL
IMM GRANULOCYTES NFR BLD AUTO: 0.1 %
INR PPP: 0.97 RATIO
LYMPHOCYTES # BLD AUTO: 0.99 K/UL
LYMPHOCYTES NFR BLD AUTO: 12.7 %
MAN DIFF?: NORMAL
MCHC RBC-ENTMCNC: 28.6 PG
MCHC RBC-ENTMCNC: 31.2 G/DL
MCV RBC AUTO: 91.7 FL
MONOCYTES # BLD AUTO: 0.59 K/UL
MONOCYTES NFR BLD AUTO: 7.6 %
NEUTROPHILS # BLD AUTO: 5.84 K/UL
NEUTROPHILS NFR BLD AUTO: 75.1 %
PLATELET # BLD AUTO: 211 K/UL
POTASSIUM SERPL-SCNC: 4.6 MMOL/L
PROT SERPL-MCNC: 7 G/DL
PT BLD: 11.5 SEC
RBC # BLD: 4.83 M/UL
RBC # FLD: 15.2 %
SODIUM SERPL-SCNC: 143 MMOL/L
WBC # FLD AUTO: 7.78 K/UL

## 2025-07-15 ENCOUNTER — NON-APPOINTMENT (OUTPATIENT)
Age: 80
End: 2025-07-15

## 2025-07-15 PROBLEM — G47.33 OSA (OBSTRUCTIVE SLEEP APNEA): Status: ACTIVE | Noted: 2025-07-15

## 2025-07-22 ENCOUNTER — OFFICE (OUTPATIENT)
Dept: URBAN - METROPOLITAN AREA CLINIC 12 | Facility: CLINIC | Age: 80
Setting detail: OPHTHALMOLOGY
End: 2025-07-22

## 2025-07-22 DIAGNOSIS — H90.3: ICD-10-CM

## 2025-07-22 PROCEDURE — NO SHOW FE NO SHOW FEE: Performed by: AUDIOLOGIST

## 2025-08-06 ENCOUNTER — NON-APPOINTMENT (OUTPATIENT)
Age: 80
End: 2025-08-06